# Patient Record
Sex: MALE | Race: WHITE | NOT HISPANIC OR LATINO | Employment: FULL TIME | URBAN - METROPOLITAN AREA
[De-identification: names, ages, dates, MRNs, and addresses within clinical notes are randomized per-mention and may not be internally consistent; named-entity substitution may affect disease eponyms.]

---

## 2023-06-22 ENCOUNTER — TELEPHONE (OUTPATIENT)
Dept: PRIMARY CARE | Facility: CLINIC | Age: 68
End: 2023-06-22
Payer: MEDICARE

## 2023-06-23 NOTE — TELEPHONE ENCOUNTER
Left DETAILED VM for pt that no bw ordered prior to apt but come fasting 10-12hrs prior to apt and bw can be drawn after his apt.

## 2023-07-12 ENCOUNTER — OFFICE VISIT (OUTPATIENT)
Dept: PRIMARY CARE | Facility: CLINIC | Age: 68
End: 2023-07-12
Payer: MEDICARE

## 2023-07-12 VITALS
HEIGHT: 72 IN | BODY MASS INDEX: 22.13 KG/M2 | DIASTOLIC BLOOD PRESSURE: 70 MMHG | WEIGHT: 163.4 LBS | RESPIRATION RATE: 18 BRPM | SYSTOLIC BLOOD PRESSURE: 138 MMHG | OXYGEN SATURATION: 99 % | HEART RATE: 83 BPM

## 2023-07-12 DIAGNOSIS — L72.0 EPIDERMOID CYST OF SKIN OF BACK: ICD-10-CM

## 2023-07-12 DIAGNOSIS — E78.2 MIXED HYPERLIPIDEMIA: ICD-10-CM

## 2023-07-12 DIAGNOSIS — L02.212 ABSCESS OF BACK: ICD-10-CM

## 2023-07-12 DIAGNOSIS — Z13.6 SCREENING FOR CARDIOVASCULAR CONDITION: ICD-10-CM

## 2023-07-12 DIAGNOSIS — Z00.00 ROUTINE GENERAL MEDICAL EXAMINATION AT HEALTH CARE FACILITY: ICD-10-CM

## 2023-07-12 DIAGNOSIS — R35.0 URINARY FREQUENCY: ICD-10-CM

## 2023-07-12 DIAGNOSIS — Z12.11 SCREENING FOR COLORECTAL CANCER: ICD-10-CM

## 2023-07-12 DIAGNOSIS — H66.002 NON-RECURRENT ACUTE SUPPURATIVE OTITIS MEDIA OF LEFT EAR WITHOUT SPONTANEOUS RUPTURE OF TYMPANIC MEMBRANE: ICD-10-CM

## 2023-07-12 DIAGNOSIS — Z00.00 MEDICARE WELCOME VISIT: Primary | ICD-10-CM

## 2023-07-12 DIAGNOSIS — Z12.5 SCREENING FOR PROSTATE CANCER: ICD-10-CM

## 2023-07-12 DIAGNOSIS — Z12.12 SCREENING FOR COLORECTAL CANCER: ICD-10-CM

## 2023-07-12 PROBLEM — E78.5 HYPERLIPIDEMIA: Status: ACTIVE | Noted: 2023-07-12

## 2023-07-12 PROBLEM — D69.6 THROMBOCYTOPENIA (CMS-HCC): Status: ACTIVE | Noted: 2023-07-12

## 2023-07-12 PROBLEM — R93.1 ABNORMAL SCREENING CARDIAC CT: Status: ACTIVE | Noted: 2023-07-12

## 2023-07-12 PROBLEM — H93.8X9 BLOCKED EAR: Status: ACTIVE | Noted: 2023-03-10

## 2023-07-12 PROBLEM — E55.9 VITAMIN D DEFICIENCY: Status: ACTIVE | Noted: 2023-07-12

## 2023-07-12 PROBLEM — M25.462 SWOLLEN L KNEE: Status: ACTIVE | Noted: 2020-07-10

## 2023-07-12 LAB
POC APPEARANCE, URINE: CLEAR
POC BILIRUBIN, URINE: NEGATIVE
POC BLOOD, URINE: NEGATIVE
POC COLOR, URINE: YELLOW
POC GLUCOSE, URINE: NEGATIVE MG/DL
POC KETONES, URINE: NEGATIVE MG/DL
POC LEUKOCYTES, URINE: NEGATIVE
POC NITRITE,URINE: NEGATIVE
POC PH, URINE: 7 PH
POC PROTEIN, URINE: NEGATIVE MG/DL
POC SPECIFIC GRAVITY, URINE: 1.01
POC UROBILINOGEN, URINE: 0.2 EU/DL

## 2023-07-12 PROCEDURE — 81003 URINALYSIS AUTO W/O SCOPE: CPT | Performed by: NURSE PRACTITIONER

## 2023-07-12 PROCEDURE — G0402 INITIAL PREVENTIVE EXAM: HCPCS | Performed by: NURSE PRACTITIONER

## 2023-07-12 PROCEDURE — 99213 OFFICE O/P EST LOW 20 MIN: CPT | Performed by: NURSE PRACTITIONER

## 2023-07-12 PROCEDURE — 1160F RVW MEDS BY RX/DR IN RCRD: CPT | Performed by: NURSE PRACTITIONER

## 2023-07-12 PROCEDURE — 1170F FXNL STATUS ASSESSED: CPT | Performed by: NURSE PRACTITIONER

## 2023-07-12 PROCEDURE — 1036F TOBACCO NON-USER: CPT | Performed by: NURSE PRACTITIONER

## 2023-07-12 PROCEDURE — 1159F MED LIST DOCD IN RCRD: CPT | Performed by: NURSE PRACTITIONER

## 2023-07-12 RX ORDER — EPINEPHRINE 0.22MG
1 AEROSOL WITH ADAPTER (ML) INHALATION 2 TIMES DAILY
COMMUNITY
Start: 2018-07-19

## 2023-07-12 RX ORDER — ASCORBIC ACID 125 MG
1 TABLET,CHEWABLE ORAL DAILY
COMMUNITY
Start: 2022-08-30

## 2023-07-12 RX ORDER — ACETAMINOPHEN 500 MG
1 TABLET ORAL DAILY
COMMUNITY
Start: 2018-07-19

## 2023-07-12 RX ORDER — LANOLIN ALCOHOL/MO/W.PET/CERES
1 CREAM (GRAM) TOPICAL DAILY
COMMUNITY
Start: 2018-07-19

## 2023-07-12 RX ORDER — AMOXICILLIN 875 MG/1
875 TABLET, FILM COATED ORAL 2 TIMES DAILY
Qty: 14 TABLET | Refills: 0 | Status: SHIPPED | OUTPATIENT
Start: 2023-07-12 | End: 2023-07-20 | Stop reason: ALTCHOICE

## 2023-07-12 RX ORDER — UBIDECARENONE 30 MG
1 CAPSULE ORAL DAILY
COMMUNITY
Start: 2018-07-19

## 2023-07-12 ASSESSMENT — ENCOUNTER SYMPTOMS
DEPRESSION: 0
LOSS OF SENSATION IN FEET: 0
OCCASIONAL FEELINGS OF UNSTEADINESS: 0

## 2023-07-12 ASSESSMENT — VISUAL ACUITY
OD_CC: 20/20
OS_CC: 20/20

## 2023-07-12 ASSESSMENT — ACTIVITIES OF DAILY LIVING (ADL)
TAKING_MEDICATION: INDEPENDENT
MANAGING_FINANCES: INDEPENDENT
BATHING: INDEPENDENT
DRESSING: INDEPENDENT
GROCERY_SHOPPING: INDEPENDENT
DOING_HOUSEWORK: INDEPENDENT

## 2023-07-12 ASSESSMENT — PATIENT HEALTH QUESTIONNAIRE - PHQ9
1. LITTLE INTEREST OR PLEASURE IN DOING THINGS: NOT AT ALL
2. FEELING DOWN, DEPRESSED OR HOPELESS: NOT AT ALL
SUM OF ALL RESPONSES TO PHQ9 QUESTIONS 1 AND 2: 0

## 2023-07-12 NOTE — PROGRESS NOTES
"Subjective   Reason for Visit: Tre Isidro is an 67 y.o. male here for a Medicare Wellness visit.     Past Medical, Surgical, and Family History reviewed and updated in chart.    Reviewed all medications by prescribing practitioner or clinical pharmacist (such as prescriptions, OTCs, herbal therapies and supplements) and documented in the medical record.    HPI    Patient Care Team:  ALLY Dunham as PCP - General (Family Medicine)     Review of Systems    Objective   Vitals:  /70   Pulse 83   Resp 18   Ht 1.835 m (6' 0.25\")   Wt 74.1 kg (163 lb 6.4 oz)   SpO2 99%   BMI 22.01 kg/m²       Physical Exam    Assessment/Plan   Problem List Items Addressed This Visit     Hyperlipidemia    Relevant Orders    Lipid Panel   Other Visit Diagnoses     Medicare welcome visit    -  Primary    Relevant Orders    Comprehensive Metabolic Panel    Abscess of back        Screening for prostate cancer        Relevant Orders    PSA, Total and Free    Urinary frequency        Relevant Orders    POCT UA Automated manually resulted (Completed)    Screening for cardiovascular condition        Relevant Orders    ECG 12 lead    Screening for colorectal cancer        Relevant Orders    Cologuard® colon cancer screening    Routine general medical examination at health care facility        Epidermoid cyst of skin of back        Relevant Orders    Referral to Dermatology    Non-recurrent acute suppurative otitis media of left ear without spontaneous rupture of tympanic membrane        Relevant Medications    amoxicillin (Amoxil) 875 mg tablet             "

## 2023-07-12 NOTE — PATIENT INSTRUCTIONS
Thank you for seeing me today.  It was a pleasure to see you again!    Today we did your Annual Medicare Wellness Exam and discussed the following:     #OTITIS MEDIA, LEFT  Rx Amoxicillin x 7 days     #URINARY FREQUENCY  IO urine negative  Call if symptoms persist after taking Amoxicillin       #CERUMEN IMPACTION  Continue to use debrox drops once or twice per week to keep wax from building up     #CYST ON BACK AND FACE  See Dermatology    Lab work ordered today.  Please have your blood drawn in the next 1-2 weeks.  You need to be FASTING for 12 hours prior to blood draw.  You may only have water.  Please contact your insurance company to ask about the best location to get blood drawn.  We will contact you with the results of your blood work and any necessary adjustments  to your plan of care, if you do not hear from us within 3-5 days of having your blood drawn, please call the office at [phone].    RTC ANNUALLY AND AS NEEDED

## 2023-07-14 ENCOUNTER — APPOINTMENT (OUTPATIENT)
Dept: PRIMARY CARE | Facility: CLINIC | Age: 68
End: 2023-07-14
Payer: MEDICARE

## 2023-07-14 ENCOUNTER — TELEPHONE (OUTPATIENT)
Dept: PRIMARY CARE | Facility: CLINIC | Age: 68
End: 2023-07-14

## 2023-07-14 DIAGNOSIS — N52.9 ERECTILE DYSFUNCTION, UNSPECIFIED ERECTILE DYSFUNCTION TYPE: Primary | ICD-10-CM

## 2023-07-20 ENCOUNTER — TELEPHONE (OUTPATIENT)
Dept: PRIMARY CARE | Facility: CLINIC | Age: 68
End: 2023-07-20
Payer: MEDICARE

## 2023-07-20 DIAGNOSIS — N41.0 ACUTE PROSTATITIS: Primary | ICD-10-CM

## 2023-07-20 RX ORDER — CIPROFLOXACIN 500 MG/1
500 TABLET ORAL 2 TIMES DAILY
Qty: 14 TABLET | Refills: 0 | Status: SHIPPED | OUTPATIENT
Start: 2023-07-20 | End: 2023-07-27

## 2023-07-27 ENCOUNTER — TELEPHONE (OUTPATIENT)
Dept: PRIMARY CARE | Facility: CLINIC | Age: 68
End: 2023-07-27
Payer: MEDICARE

## 2023-07-27 DIAGNOSIS — H91.93 BILATERAL HEARING LOSS, UNSPECIFIED HEARING LOSS TYPE: Primary | ICD-10-CM

## 2023-08-02 ENCOUNTER — LAB (OUTPATIENT)
Dept: LAB | Facility: LAB | Age: 68
End: 2023-08-02
Payer: MEDICARE

## 2023-08-02 DIAGNOSIS — E78.2 MIXED HYPERLIPIDEMIA: ICD-10-CM

## 2023-08-02 DIAGNOSIS — Z00.00 MEDICARE WELCOME VISIT: ICD-10-CM

## 2023-08-02 DIAGNOSIS — Z12.5 SCREENING FOR PROSTATE CANCER: ICD-10-CM

## 2023-08-02 LAB
ALANINE AMINOTRANSFERASE (SGPT) (U/L) IN SER/PLAS: 16 U/L (ref 10–52)
ALBUMIN (G/DL) IN SER/PLAS: 4.3 G/DL (ref 3.4–5)
ALKALINE PHOSPHATASE (U/L) IN SER/PLAS: 41 U/L (ref 33–136)
ANION GAP IN SER/PLAS: 14 MMOL/L (ref 10–20)
ASPARTATE AMINOTRANSFERASE (SGOT) (U/L) IN SER/PLAS: 21 U/L (ref 9–39)
BILIRUBIN TOTAL (MG/DL) IN SER/PLAS: 0.6 MG/DL (ref 0–1.2)
CALCIUM (MG/DL) IN SER/PLAS: 9.5 MG/DL (ref 8.6–10.3)
CARBON DIOXIDE, TOTAL (MMOL/L) IN SER/PLAS: 27 MMOL/L (ref 21–32)
CHLORIDE (MMOL/L) IN SER/PLAS: 104 MMOL/L (ref 98–107)
CHOLESTEROL (MG/DL) IN SER/PLAS: 175 MG/DL (ref 0–199)
CHOLESTEROL IN HDL (MG/DL) IN SER/PLAS: 52.8 MG/DL
CHOLESTEROL/HDL RATIO: 3.3
CREATININE (MG/DL) IN SER/PLAS: 0.75 MG/DL (ref 0.5–1.3)
GFR MALE: >90 ML/MIN/1.73M2
GLUCOSE (MG/DL) IN SER/PLAS: 83 MG/DL (ref 74–99)
LDL: 101 MG/DL (ref 0–99)
POTASSIUM (MMOL/L) IN SER/PLAS: 4.5 MMOL/L (ref 3.5–5.3)
PROTEIN TOTAL: 6.4 G/DL (ref 6.4–8.2)
SODIUM (MMOL/L) IN SER/PLAS: 140 MMOL/L (ref 136–145)
TRIGLYCERIDE (MG/DL) IN SER/PLAS: 107 MG/DL (ref 0–149)
UREA NITROGEN (MG/DL) IN SER/PLAS: 8 MG/DL (ref 6–23)
VLDL: 21 MG/DL (ref 0–40)

## 2023-08-02 PROCEDURE — 80061 LIPID PANEL: CPT

## 2023-08-02 PROCEDURE — 36415 COLL VENOUS BLD VENIPUNCTURE: CPT

## 2023-08-02 PROCEDURE — 84154 ASSAY OF PSA FREE: CPT

## 2023-08-02 PROCEDURE — 80053 COMPREHEN METABOLIC PANEL: CPT

## 2023-08-02 PROCEDURE — G0103 PSA SCREENING: HCPCS

## 2023-08-07 LAB
PROSTATE SPECIFIC AG (NG/ML) IN SER/PLAS: 1.3 NG/ML (ref 0–4)
PROSTATE SPECIFIC AG FREE (NG/ML) IN SER/PLAS: 0.3 NG/ML
PROSTATE SPECIFIC AG FREE/PROSTATE SPECIFIC AG TOTAL IN SER/PLAS: 23 %

## 2023-08-09 LAB — URINE CULTURE: NO GROWTH

## 2023-08-10 DIAGNOSIS — E55.9 VITAMIN D DEFICIENCY, UNSPECIFIED: ICD-10-CM

## 2023-08-10 DIAGNOSIS — E55.9 VITAMIN D DEFICIENCY: ICD-10-CM

## 2023-08-10 DIAGNOSIS — D69.6 THROMBOCYTOPENIA (CMS-HCC): Primary | ICD-10-CM

## 2023-08-29 ENCOUNTER — LAB (OUTPATIENT)
Dept: LAB | Facility: LAB | Age: 68
End: 2023-08-29
Payer: MEDICARE

## 2023-08-29 DIAGNOSIS — E55.9 VITAMIN D DEFICIENCY, UNSPECIFIED: ICD-10-CM

## 2023-08-29 DIAGNOSIS — E55.9 VITAMIN D DEFICIENCY: ICD-10-CM

## 2023-08-29 DIAGNOSIS — D69.6 THROMBOCYTOPENIA (CMS-HCC): ICD-10-CM

## 2023-08-29 LAB
CALCIDIOL (25 OH VITAMIN D3) (NG/ML) IN SER/PLAS: 35 NG/ML
ERYTHROCYTE DISTRIBUTION WIDTH (RATIO) BY AUTOMATED COUNT: 11.9 % (ref 11.5–14.5)
ERYTHROCYTE MEAN CORPUSCULAR HEMOGLOBIN CONCENTRATION (G/DL) BY AUTOMATED: 32.9 G/DL (ref 32–36)
ERYTHROCYTE MEAN CORPUSCULAR VOLUME (FL) BY AUTOMATED COUNT: 98 FL (ref 80–100)
ERYTHROCYTES (10*6/UL) IN BLOOD BY AUTOMATED COUNT: 4.57 X10E12/L (ref 4.5–5.9)
ESTIMATED AVERAGE GLUCOSE FOR HBA1C: 97 MG/DL
FOLLITROPIN (IU/L) IN SER/PLAS: 12.3 IU/L
HEMATOCRIT (%) IN BLOOD BY AUTOMATED COUNT: 44.7 % (ref 41–52)
HEMATOCRIT (%) IN BLOOD BY AUTOMATED COUNT: NORMAL
HEMOGLOBIN (G/DL) IN BLOOD: 14.7 G/DL (ref 13.5–17.5)
HEMOGLOBIN A1C/HEMOGLOBIN TOTAL IN BLOOD: 5 %
LEUKOCYTES (10*3/UL) IN BLOOD BY AUTOMATED COUNT: 5.1 X10E9/L (ref 4.4–11.3)
LUTEINIZING HORMONE (IU/ML) IN SER/PLAS: 6.7 IU/L
PLATELETS (10*3/UL) IN BLOOD AUTOMATED COUNT: 137 X10E9/L (ref 150–450)
PROLACTIN (UG/L) IN SER/PLAS: 11.5 UG/L (ref 2–18)
TESTOSTERONE (NG/DL) IN SER/PLAS: 830 NG/DL (ref 240–1000)

## 2023-08-29 PROCEDURE — 36415 COLL VENOUS BLD VENIPUNCTURE: CPT

## 2023-08-29 PROCEDURE — 82306 VITAMIN D 25 HYDROXY: CPT

## 2023-08-29 PROCEDURE — 85027 COMPLETE CBC AUTOMATED: CPT

## 2023-10-01 PROBLEM — R06.02 EXERTIONAL SHORTNESS OF BREATH: Status: ACTIVE | Noted: 2023-10-01

## 2023-10-01 PROBLEM — N52.9 ERECTILE DYSFUNCTION: Status: ACTIVE | Noted: 2023-10-01

## 2023-10-01 PROBLEM — H65.92 LEFT OTITIS MEDIA WITH EFFUSION: Status: ACTIVE | Noted: 2023-10-01

## 2023-10-01 PROBLEM — N13.8 BPH WITH OBSTRUCTION/LOWER URINARY TRACT SYMPTOMS: Status: ACTIVE | Noted: 2023-10-01

## 2023-10-01 PROBLEM — R39.15 URINARY URGENCY: Status: ACTIVE | Noted: 2023-10-01

## 2023-10-01 PROBLEM — F52.4 PREMATURE EJACULATION: Status: ACTIVE | Noted: 2023-10-01

## 2023-10-01 PROBLEM — N40.1 BPH WITH OBSTRUCTION/LOWER URINARY TRACT SYMPTOMS: Status: ACTIVE | Noted: 2023-10-01

## 2023-10-01 RX ORDER — TADALAFIL 20 MG/1
20 TABLET ORAL
COMMUNITY
Start: 2023-09-22

## 2023-10-03 ENCOUNTER — APPOINTMENT (OUTPATIENT)
Dept: AUDIOLOGY | Facility: CLINIC | Age: 68
End: 2023-10-03
Payer: MEDICARE

## 2023-10-03 ENCOUNTER — APPOINTMENT (OUTPATIENT)
Dept: OTOLARYNGOLOGY | Facility: CLINIC | Age: 68
End: 2023-10-03
Payer: MEDICARE

## 2023-10-09 ENCOUNTER — OFFICE VISIT (OUTPATIENT)
Dept: DERMATOLOGY | Facility: CLINIC | Age: 68
End: 2023-10-09
Payer: MEDICARE

## 2023-10-09 DIAGNOSIS — D69.2: ICD-10-CM

## 2023-10-09 DIAGNOSIS — L57.0 ACTINIC KERATOSIS: Primary | ICD-10-CM

## 2023-10-09 DIAGNOSIS — M67.40 GANGLION CYST: ICD-10-CM

## 2023-10-09 DIAGNOSIS — L72.0 EPIDERMAL CYST: ICD-10-CM

## 2023-10-09 DIAGNOSIS — L82.1 SEBORRHEIC KERATOSIS: ICD-10-CM

## 2023-10-09 PROCEDURE — 1160F RVW MEDS BY RX/DR IN RCRD: CPT | Performed by: SPECIALIST

## 2023-10-09 PROCEDURE — 17000 DESTRUCT PREMALG LESION: CPT | Performed by: SPECIALIST

## 2023-10-09 PROCEDURE — 99214 OFFICE O/P EST MOD 30 MIN: CPT | Performed by: SPECIALIST

## 2023-10-09 PROCEDURE — 17003 DESTRUCT PREMALG LES 2-14: CPT | Performed by: SPECIALIST

## 2023-10-09 PROCEDURE — 1036F TOBACCO NON-USER: CPT | Performed by: SPECIALIST

## 2023-10-09 PROCEDURE — 1159F MED LIST DOCD IN RCRD: CPT | Performed by: SPECIALIST

## 2023-10-09 NOTE — PROGRESS NOTES
Subjective   HPI: Tre Isidro is a 67 y.o. male is here for evaluation and treatment of several lesions involving his body..     ROS: No other skin or systemic complaints other than what is documented elsewhere in the note.    Skin Cancer History  No skin cancer on file.      ALLERGIES: Sulfamethoxazole-trimethoprim    SOCIAL:  reports that he has never smoked. He has never used smokeless tobacco. He reports current alcohol use of about 6.0 standard drinks of alcohol per week. He reports that he does not currently use drugs.    Objective   Mid Forehead (2), Right [employer]  -Thin erythematous papules with gritty scale      Left Zygomatic Area, Mid Back  Subcutaneous, mobile nodule with a central punctum.    Right Abdomen (side) - Upper, Right Forearm - Anterior  Stuck on verrucous, variably pigmented papules and plaques.    Left Knee - Anterior          Description: Patient has 2 erythematous scaly sensitive lesions on photodamaged area mid forehead and right temple.    Assessment/Plan   1. Actinic keratosis (3)  Mid Forehead (2); Right [employer]    Educated patient on the potential for malignant transformation. Patient opted for treatment at this time with cryotherapy.    Cryotherapy, skin lesion - Mid Forehead (2), Right Temple    2. Epidermal cyst (2)  Mid Back; Left Zygomatic Area    Schedule for excision on x2 separate sterile Tuesdays    3. Seborrheic keratosis (2)  Right Forearm - Anterior; Right Abdomen (side) - Upper    Educated patient on the benign nature of Sk's and how to know when they are inflamed. Patient opts for no treatment at this time.    4. Ganglion cyst  Left Knee - Anterior    5. Non-thrombocytopenic purpura (CMS/HCC)  Right Forearm - Anterior         FOLLOW UP: 2 weeks for sterile surgical excision and follow-up.  This but this could be more thanSubjective   HPI: Tre Isidro is a 67 y.o. male is here for evaluation and treatment of   Discolored areas involving his forearms.  ROS: No other skin  or systemic complaints other than what is documented elsewhere in the note.    Skin Cancer History  No skin cancer on file.      ALLERGIES: Sulfamethoxazole-trimethoprim    SOCIAL:  reports that he has never smoked. He has never used smokeless tobacco. He reports current alcohol use of about 6.0 standard drinks of alcohol per week. He reports that he does not currently use drugs.    Objective   Mid Forehead (2), Right [employer]  -Thin erythematous papules with gritty scale      Left Zygomatic Area, Mid Back  Subcutaneous, mobile nodule with a central punctum.    Right Abdomen (side) - Upper, Right Forearm - Anterior  Stuck on verrucous, variably pigmented papules and plaques.    Left Knee - Anterior          Description: Erythematous scaly lesions involving his forehead.    Assessment/Plan   1. Actinic keratosis (3)  Mid Forehead (2); Right [employer]    Educated patient on the potential for malignant transformation. Patient opted for treatment at this time with cryotherapy.    Cryotherapy, skin lesion - Mid Forehead (2), Right Temple    2. Epidermal cyst (2)  Mid Back; Left Zygomatic Area    Schedule for excision on x2 separate sterile Tuesdays    3. Seborrheic keratosis (2)  Right Forearm - Anterior; Right Abdomen (side) - Upper    Educated patient on the benign nature of Sk's and how to know when they are inflamed. Patient opts for no treatment at this time.    4. Ganglion cyst  Left Knee - Anterior    5. Non-thrombocytopenic purpura (CMS/HCC)  Right Forearm - Anterior         FOLLOW UP: 2 weeks for sterile surgical excision.    The patient was encouraged to contact me with any further questions or concerns.  Mikal Benitez MD  10/9/2023 so this right ear versus left knee    The patient was encouraged to contact me with any further questions or concerns.  Mikal Benitez MD  10/9/2023

## 2023-10-17 ENCOUNTER — OFFICE VISIT (OUTPATIENT)
Dept: OTOLARYNGOLOGY | Facility: CLINIC | Age: 68
End: 2023-10-17
Payer: MEDICARE

## 2023-10-17 VITALS — WEIGHT: 160 LBS | BODY MASS INDEX: 21.55 KG/M2

## 2023-10-17 DIAGNOSIS — J31.0 CHRONIC RHINITIS: ICD-10-CM

## 2023-10-17 DIAGNOSIS — J30.9 ALLERGIC RHINITIS, UNSPECIFIED SEASONALITY, UNSPECIFIED TRIGGER: ICD-10-CM

## 2023-10-17 DIAGNOSIS — H69.90 DISORDER OF EUSTACHIAN TUBE, UNSPECIFIED LATERALITY: ICD-10-CM

## 2023-10-17 DIAGNOSIS — K21.9 LARYNGOPHARYNGEAL REFLUX (LPR): ICD-10-CM

## 2023-10-17 PROCEDURE — 1036F TOBACCO NON-USER: CPT | Performed by: GENERAL PRACTICE

## 2023-10-17 PROCEDURE — 99204 OFFICE O/P NEW MOD 45 MIN: CPT | Performed by: GENERAL PRACTICE

## 2023-10-17 PROCEDURE — 69210 REMOVE IMPACTED EAR WAX UNI: CPT | Performed by: GENERAL PRACTICE

## 2023-10-17 PROCEDURE — 1159F MED LIST DOCD IN RCRD: CPT | Performed by: GENERAL PRACTICE

## 2023-10-17 PROCEDURE — 1160F RVW MEDS BY RX/DR IN RCRD: CPT | Performed by: GENERAL PRACTICE

## 2023-10-17 RX ORDER — FLUTICASONE PROPIONATE 50 MCG
2 SPRAY, SUSPENSION (ML) NASAL DAILY
Qty: 16 G | Refills: 2 | Status: SHIPPED | OUTPATIENT
Start: 2023-10-17 | End: 2023-11-16

## 2023-10-17 RX ORDER — FAMOTIDINE 20 MG/1
20 TABLET, FILM COATED ORAL NIGHTLY
Qty: 30 TABLET | Refills: 2 | Status: SHIPPED | OUTPATIENT
Start: 2023-10-17 | End: 2023-11-16

## 2023-10-17 ASSESSMENT — ENCOUNTER SYMPTOMS
COUGH: 0
NECK PAIN: 0
ABDOMINAL PAIN: 0
DIARRHEA: 0
SORE THROAT: 0
RHINORRHEA: 0
VOMITING: 0
HEADACHES: 0

## 2023-10-17 ASSESSMENT — PATIENT HEALTH QUESTIONNAIRE - PHQ9
2. FEELING DOWN, DEPRESSED OR HOPELESS: NOT AT ALL
1. LITTLE INTEREST OR PLEASURE IN DOING THINGS: NOT AT ALL
SUM OF ALL RESPONSES TO PHQ9 QUESTIONS 1 AND 2: 0

## 2023-10-17 NOTE — PROGRESS NOTES
Otolaryngology - Head and Neck Surgery Outpatient New Patient Visit Note         History Of Present Illness  Tre Isidro is a 67 y.o. male presenting for evaluation of left ear discomfort.     Reports a history of cerumen impaction, debrided by PCM.  Reports some discomfort with debridement, and mild waxing/waning soreness since chase ttime.     Reports some baseline hearing loss L>R for >1y.      Reports treatment with oral antibiotics for otalgia with suspicions of AOM, but no improvement.     Reports ongoing mild itching and ear irritation on the left.      Reports some difficulty in clearing ears L>R     Reports history of allergic rhinitis, with intermittent flonase use with some effect.      Reports a history of GERD for which he uses famotidine with some effect.               Past Medical History  He has a past medical history of Personal history of other diseases of the respiratory system, Personal history of other endocrine, nutritional and metabolic disease, and Personal history of other infectious and parasitic diseases.    Surgical History  He has a past surgical history that includes [place] tooth extraction ().     Social History  He reports that he has never smoked. He has never used smokeless tobacco. He reports current alcohol use of about 6.0 standard drinks of alcohol per week. He reports that he does not currently use drugs.    Family History  Family History   Problem Relation Name Age of Onset    Arthritis Mother Rose Marie         Mother  at age 80    Diabetes Mother Rose Marie     Stroke Mother Rose Marie     Heart failure Mother Rose Marie     Osteoarthritis Mother Rose Marie     Heart disease Father Rajinder     Heart attack Father Rajinder     Colon cancer Other family HX     Prostate cancer Other family HX         Allergies  Sulfamethoxazole-trimethoprim    Review of Systems  ROS: Pertinent positives as noted in HPI.    - CONSTITUTIONAL: Does not report weight loss, fever or chills.    - HEENT:   Ear: Does  not report tinnitus, vertigo,    ,  , otorrhea  Nose: Does not report  , rhinorrhea, epistaxis, decreased smell  Throat: Does not report pain, dysphagia, odynophagia  Larynx: Does not report hoarseness,  difficulty breathing, pain with speaking (odynophonia)  Neck: Does not report new masses, pain, swelling  Face: Does not report sinus pain, pressure, swelling, numbness, weakness     - RESPIRATORY: Does not report SOB or cough.    - CV: Does not report palpitations or chest pain.     - GI: Does not report abdominal pain, nausea, vomiting or diarrhea.    - : Does not report dysuria or urinary frequency.    - MSK: Does not report myalgia or joint pain.    - SKIN: Does not report rash or pruritus.    - NEUROLOGICAL: Does not report headache or syncope.    - PSYCHIATRIC: Does not report recent changes in mood. Does not report anxiety or depression.         Physical Exam:     GENERAL:   Alert & Oriented to person, place and time; Normal affect and appearance. Well developed and well nourished. Conversant & cooperative with examination.     HEAD:   Normocephalic, atraumatic. No sinus tenderness to palpation. Normal parotid bilaterally. Normal facial strength.     NEUROLOGIC:   Cranial nerves II-XII grossly intact, gait WNL. Normal mood and affect.    EYES:   Extraocular movements intact. Pupils equal, round, reactive to light and accommodation. No nystagmus, no ptosis. no scleral injection.    EAR:   Normal auricle. No discomfort or TTP with manipulation.   Handheld otoscopic exam showed normal external auditory canals bilaterally. No purulence or EAC inflammation. Obstructive cerumen remvoved with suction.   Right tympanic membrane clear and mobile without evidence of perforation, retraction or middle ear effusion.   Left tympanic membrane clear and poorly mobile without evidence of perforation, retraction or middle ear effusion.     NOSE:   No external deformity. No external nasal lesions, lacerations, or scars. Nasal  tip symmetrical with normal nasal valves.   Nasal cavity with essentially midline septum, edematous  mucosa and turbinates. No lesions, masses, purulence or polyps.     OC/OP:   Mucous membranes moist, no masses, lesions or exudates.   Normal tongue, floor of mouth, teeth, gums, lips. Normal posterior pharyngeal wall.    Normal tonsils without erythema, exudate or obvious calculi     NECK:   No neck masses or thyroid enlargement. Trachea midline. No tenderness to palpation    LYMPHATIC:   No cervical lymphadenopathy.     RESPIRATORY:   Symmetric chest elevation & no retractions. No significant hoarseness. No increased work of breathing.    CV:   No clubbing or cyanosis. No obvious edema    Skin:   No facial rashes, vesicles or lesions.     Extremities:   No gross abnormalities      Clinic Procedure    Binocular microscopy exam  Indication: tympanic membrane(s) could not be visualized adequately with handheld otoscopy.   Location:  bilateral ears  Visualization Instrument: A microscope was used to visualize through a speculum placed in the ear canal(s) to visualize the ear canal, tympanic membranes and to assist in assessment and removal of debris.  Findings:  see physical exam documentation  Patient Status: The patient tolerated the procedure well.   Complications: There were no complications.       Information review:  External sources (notes, imaging, lab results) listed below personally reviewed to aid in medical decision making process.  -  -  -      Assessment/Plan   Problem List Items Addressed This Visit    None  Visit Diagnoses         Codes    Disorder of Eustachian tube, unspecified laterality     H69.90    Relevant Orders    Referral to Audiology    Allergic rhinitis, unspecified seasonality, unspecified trigger     J30.9    Relevant Medications    fluticasone (Flonase) 50 mcg/actuation nasal spray    Laryngopharyngeal reflux (LPR)     K21.9    Relevant Medications    famotidine (Pepcid) 20 mg tablet     Chronic rhinitis     J31.0          67yoM with bothersome ear itching/irritation.  Some residual cerumen debrided, but no evidence of otitis.  Symptoms suggestive of ETD, will control for history of rhinitis and reflux and acquire audiogram.  RTC to review.           Follow up:  -plan for follow up in clinic as needed and in 1-2 months    All of the above findings, impressions, treatment planning and follow up plans were discussed with the patient who indicated understanding.  the patient was instructed to contact or return to clinic sooner if symptoms/signs persist or worsen despite the above management.      Brown Malin MD  Otolaryngology - Head and Neck Surgery   Answers submitted by the patient for this visit:  Ear Pain Questionnaire (Submitted on 10/17/2023)  Chief Complaint: Ear pain  Affected ear: left  Chronicity: new  Onset: more than 1 month ago  Progression since onset: unchanged  Frequency: constantly  Fever: no fever  Pain - numeric: 1/10  abdominal pain: No  ear discharge: No  rash: No  cough: No  headaches: No  rhinorrhea: No  diarrhea: No  hearing loss: Yes  sore throat: No  neck pain: No  vomiting: No

## 2023-10-31 ENCOUNTER — PROCEDURE VISIT (OUTPATIENT)
Dept: DERMATOLOGY | Facility: CLINIC | Age: 68
End: 2023-10-31
Payer: MEDICARE

## 2023-10-31 DIAGNOSIS — L72.0 EPIDERMAL CYST: ICD-10-CM

## 2023-10-31 PROCEDURE — 13132 CMPLX RPR F/C/C/M/N/AX/G/H/F: CPT | Performed by: SPECIALIST

## 2023-10-31 PROCEDURE — 88304 TISSUE EXAM BY PATHOLOGIST: CPT | Mod: TC,DER | Performed by: SPECIALIST

## 2023-10-31 PROCEDURE — 11423 EXC H-F-NK-SP B9+MARG 2.1-3: CPT | Performed by: SPECIALIST

## 2023-10-31 PROCEDURE — 88304 TISSUE EXAM BY PATHOLOGIST: CPT | Performed by: DERMATOLOGY

## 2023-10-31 NOTE — PROGRESS NOTES
Subjective     Tre Isidro is a 67 y.o. male who presents for the following: Sterile Surgery (Epidermal Cyst ).     Review of Systems:  No other skin or systemic complaints other than what is documented elsewhere in the note.    The following portions of the chart were reviewed this encounter and updated as appropriate:          Skin Cancer History  No skin cancer on file.      Specialty Problems    None       Objective   Well appearing patient in no apparent distress; mood and affect are within normal limits.    A focused skin examination was performed. All findings within normal limits unless otherwise noted below.    Assessment/Plan   1. Epidermal cyst  Left Preauricular Area    Skin excision - Left Preauricular Area    Informed consent: discussed and consent obtained    Timeout: patient name, date of birth, surgical site, and procedure verified    Procedure prep:  Patient prepped in sterile fashion  Anesthesia: the lesion was anesthetized in a standard fashion    Anesthetic:  1% lidocaine w/ epinephrine 1-100,000 local infiltration  Instrument used: #15 blade    Hemostasis achieved with: suture, pressure and electrodesiccation    Outcome: patient tolerated procedure well with no complications    Post-procedure details: sterile dressing applied and wound care instructions given    Dressing type: bandage    Additional details:  The possible diagnoses were explained. Although the lesion is likely benign, the patient requests removal of the lesion because of the symptoms it is causing. Excision was discussed with the patient. The risks, benefits and potential adverse effects were reviewed. Discussion included but was not limited to the cure rate, relative cost, wound care requirements, activity restrictions, likely scar outcome and time to heal were reviewed. Alternative options including monitoring the lesion were discussed. The patient elected to proceed with excision.     Specimen 1 - Dermatopathology- DERM  LAB  Differential Diagnosis: epidermal cyst vs lipoma  Check Margins Yes/No?:    Comments:    Dermpath Lab: Routine Histopathology (formalin-fixed tissue)

## 2023-11-02 LAB
LABORATORY COMMENT REPORT: NORMAL
PATH REPORT.FINAL DX SPEC: NORMAL
PATH REPORT.GROSS SPEC: NORMAL
PATH REPORT.MICROSCOPIC SPEC OTHER STN: NORMAL
PATH REPORT.RELEVANT HX SPEC: NORMAL
PATH REPORT.TOTAL CANCER: NORMAL

## 2023-11-10 ENCOUNTER — OFFICE VISIT (OUTPATIENT)
Dept: DERMATOLOGY | Facility: CLINIC | Age: 68
End: 2023-11-10
Payer: MEDICARE

## 2023-11-10 DIAGNOSIS — Z48.02 VISIT FOR SUTURE REMOVAL: ICD-10-CM

## 2023-11-10 PROCEDURE — 1036F TOBACCO NON-USER: CPT | Performed by: SPECIALIST

## 2023-11-10 PROCEDURE — 1160F RVW MEDS BY RX/DR IN RCRD: CPT | Performed by: SPECIALIST

## 2023-11-10 PROCEDURE — 99024 POSTOP FOLLOW-UP VISIT: CPT | Performed by: SPECIALIST

## 2023-11-10 PROCEDURE — 1159F MED LIST DOCD IN RCRD: CPT | Performed by: SPECIALIST

## 2023-11-10 ASSESSMENT — PATIENT GLOBAL ASSESSMENT (PGA): WHAT IS THE PGA: PATIENT GLOBAL ASSESSMENT:  1 - CLEAR

## 2023-11-10 ASSESSMENT — DERMATOLOGY QUALITY OF LIFE (QOL) ASSESSMENT
RATE HOW BOTHERED YOU ARE BY SYMPTOMS OF YOUR SKIN PROBLEM (EG, ITCHING, STINGING BURNING, HURTING OR SKIN IRRITATION): 1
WHAT SINGLE SKIN CONDITION LISTED BELOW IS THE PATIENT ANSWERING THE QUALITY-OF-LIFE ASSESSMENT QUESTIONS ABOUT: NONE OF THE ABOVE
DATE THE QUALITY-OF-LIFE ASSESSMENT WAS COMPLETED: 66788
RATE HOW BOTHERED YOU ARE BY EFFECTS OF YOUR SKIN PROBLEMS ON YOUR ACTIVITIES (EG, GOING OUT, ACCOMPLISHING WHAT YOU WANT, WORK ACTIVITIES OR YOUR RELATIONSHIPS WITH OTHERS): 0 - NEVER BOTHERED
RATE HOW BOTHERED YOU ARE BY EFFECTS OF YOUR SKIN PROBLEMS ON YOUR ACTIVITIES (EG, GOING OUT, ACCOMPLISHING WHAT YOU WANT, WORK ACTIVITIES OR YOUR RELATIONSHIPS WITH OTHERS): 0 - NEVER BOTHERED
RATE HOW BOTHERED YOU ARE BY SYMPTOMS OF YOUR SKIN PROBLEM (EG, ITCHING, STINGING BURNING, HURTING OR SKIN IRRITATION): 1
WHAT SINGLE SKIN CONDITION LISTED BELOW IS THE PATIENT ANSWERING THE QUALITY-OF-LIFE ASSESSMENT QUESTIONS ABOUT: NONE OF THE ABOVE
RATE HOW EMOTIONALLY BOTHERED YOU ARE BY YOUR SKIN PROBLEM (FOR EXAMPLE, WORRY, EMBARRASSMENT, FRUSTRATION): 0 - NEVER BOTHERED
RATE HOW EMOTIONALLY BOTHERED YOU ARE BY YOUR SKIN PROBLEM (FOR EXAMPLE, WORRY, EMBARRASSMENT, FRUSTRATION): 0 - NEVER BOTHERED

## 2023-11-10 NOTE — PROGRESS NOTES
Eleni Isidro is a 67 y.o. male who presents for the following: Suture / Staple Removal (SKIN, LEFT PREAURICULAR AREA: [id number]/EPIDERMAL INCLUSION CYST./ /).     Review of Systems:  No other skin or systemic complaints other than what is documented elsewhere in the note.    The following portions of the chart were reviewed this encounter and updated as appropriate:          Skin Cancer History  No skin cancer on file.      Specialty Problems    None       Objective   Well appearing patient in no apparent distress; mood and affect are within normal limits.    A focused skin examination was performed. All findings within normal limits unless otherwise noted below.    Assessment/Plan   1. Visit for suture removal  Left Preauricular Area        Eleni Isidro is a 67 y.o. male who presents for the following: Suture / Staple Removal.     Review of Systems:  No other skin or systemic complaints other than what is documented elsewhere in the note.    The following portions of the chart were reviewed this encounter and updated as appropriate:          Skin Cancer History  No skin cancer on file.      Specialty Problems    None       Objective   Well appearing patient in no apparent distress; mood and affect are within normal limits.    A focused skin examination was performed. All findings within normal limits unless otherwise noted below.    Assessment/Plan

## 2023-11-14 ENCOUNTER — APPOINTMENT (OUTPATIENT)
Dept: DERMATOLOGY | Facility: CLINIC | Age: 68
End: 2023-11-14
Payer: MEDICARE

## 2023-11-28 ENCOUNTER — APPOINTMENT (OUTPATIENT)
Dept: DERMATOLOGY | Facility: CLINIC | Age: 68
End: 2023-11-28
Payer: MEDICARE

## 2023-12-19 ENCOUNTER — PROCEDURE VISIT (OUTPATIENT)
Dept: DERMATOLOGY | Facility: CLINIC | Age: 68
End: 2023-12-19
Payer: MEDICARE

## 2023-12-19 DIAGNOSIS — L72.0 EPIDERMAL CYST: ICD-10-CM

## 2023-12-19 PROCEDURE — 13101 CMPLX RPR TRUNK 2.6-7.5 CM: CPT | Performed by: SPECIALIST

## 2023-12-19 PROCEDURE — 11406 EXC TR-EXT B9+MARG >4.0 CM: CPT | Performed by: SPECIALIST

## 2023-12-19 PROCEDURE — 88304 TISSUE EXAM BY PATHOLOGIST: CPT | Mod: TC,DER | Performed by: SPECIALIST

## 2023-12-19 PROCEDURE — 88304 TISSUE EXAM BY PATHOLOGIST: CPT | Performed by: DERMATOLOGY

## 2023-12-19 NOTE — PROGRESS NOTES
Subjective     Tre Isidro is a 68 y.o. male who presents for the following: SS Procedure.     Review of Systems:  No other skin or systemic complaints other than what is documented elsewhere in the note.    The following portions of the chart were reviewed this encounter and updated as appropriate:          Skin Cancer History  No skin cancer on file.      Specialty Problems    None       Objective   Well appearing patient in no apparent distress; mood and affect are within normal limits.    A focused skin examination was performed. All findings within normal limits unless otherwise noted below.    Assessment/Plan   1. Epidermal cyst  Mid Back    Skin excision - Mid Back    Timeout: patient name, date of birth, surgical site, and procedure verified    Procedure prep:  Patient was prepped and draped  Instrument used: #15 blade    Hemostasis achieved with: suture    Outcome: patient tolerated procedure well with no complications    Post-procedure details: wound care instructions given      Specimen 1 - Dermatopathology- DERM LAB  Differential Diagnosis: cyst   Check Margins Yes/No?:    Comments:    Dermpath Lab: Routine Histopathology (formalin-fixed tissue)

## 2023-12-19 NOTE — PROGRESS NOTES
Subjective   HPI: Tre Isidro is a 68 y.o. male is here for evaluation and treatment of a cyst on my back.    ROS: No other skin or systemic complaints other than what is documented elsewhere in the note.    ALLERGIES: Sulfamethoxazole-trimethoprim    SOCIAL:  reports that he has never smoked. He has never used smokeless tobacco. He reports current alcohol use of about 6.0 standard drinks of alcohol per week. He reports that he does not currently use drugs.    Objective     Description: This is an inflamed dome-shaped hard subcutaneous cystic lesion with a giant open comedone located centrally.    Assessment/Plan   1. Epidermal cyst  Mid Back    Skin excision - Mid Back    Specimen 1 - Dermatopathology- DERM LAB  Differential Diagnosis: cyst   Check Margins Yes/No?:    Comments:    Dermpath Lab: Routine Histopathology (formalin-fixed tissue)         FOLLOW UP: 10 days for suture removal.    The patient was encouraged to contact me with any further questions or concerns.  Mikal Benitez MD  12/19/2023

## 2023-12-29 ENCOUNTER — OFFICE VISIT (OUTPATIENT)
Dept: DERMATOLOGY | Facility: CLINIC | Age: 68
End: 2023-12-29
Payer: MEDICARE

## 2023-12-29 DIAGNOSIS — Z48.02 VISIT FOR SUTURE REMOVAL: ICD-10-CM

## 2023-12-29 PROCEDURE — 1159F MED LIST DOCD IN RCRD: CPT | Performed by: SPECIALIST

## 2023-12-29 PROCEDURE — 99024 POSTOP FOLLOW-UP VISIT: CPT | Performed by: SPECIALIST

## 2023-12-29 PROCEDURE — 1160F RVW MEDS BY RX/DR IN RCRD: CPT | Performed by: SPECIALIST

## 2023-12-29 PROCEDURE — 1036F TOBACCO NON-USER: CPT | Performed by: SPECIALIST

## 2023-12-29 NOTE — PROGRESS NOTES
Subjective   HPI: Tre Isidro is a 68 y.o. male is here for suture removal.     ROS: No other skin or systemic complaints other than what is documented elsewhere in the note.    ALLERGIES: Sulfamethoxazole-trimethoprim    SOCIAL:  reports that he has never smoked. He has never used smokeless tobacco. He reports current alcohol use of about 6.0 standard drinks of alcohol per week. He reports that he does not currently use drugs.    Objective     Description: This is a well-healing surgical wound.  There is no sign of infection.  Sutures removed without incident.    Assessment/Plan   1. Visit for suture removal  Right Upper Back         FOLLOW UP: 3 months or sooner if any lesions change.    The patient was encouraged to contact me with any further questions or concerns.  Mikal Benitez MD  12/29/2023

## 2023-12-29 NOTE — PROGRESS NOTES
Subjective     Tre Isidro is a 68 y.o. male who presents for the following: Suture / Staple Removal ([id number] /SKIN MID BACK:/EPIDERMAL INCLUSION CYST./).     Review of Systems:  No other skin or systemic complaints other than what is documented elsewhere in the note.    The following portions of the chart were reviewed this encounter and updated as appropriate:          Skin Cancer History  No skin cancer on file.      Specialty Problems    None       Objective   Well appearing patient in no apparent distress; mood and affect are within normal limits.    A focused skin examination was performed. All findings within normal limits unless otherwise noted below.    Assessment/Plan   1. Visit for suture removal  Right Upper Back

## 2024-02-27 ENCOUNTER — PROCEDURE VISIT (OUTPATIENT)
Dept: DERMATOLOGY | Facility: CLINIC | Age: 69
End: 2024-02-27
Payer: MEDICARE

## 2024-02-27 DIAGNOSIS — L72.3 SEBACEOUS CYST: ICD-10-CM

## 2024-02-27 PROCEDURE — 88304 TISSUE EXAM BY PATHOLOGIST: CPT | Performed by: DERMATOLOGY

## 2024-02-27 PROCEDURE — 11400 EXC TR-EXT B9+MARG 0.5 CM<: CPT | Performed by: SPECIALIST

## 2024-02-27 NOTE — PROGRESS NOTES
Subjective   HPI: Tre Isidro is a 68 y.o. male is here for evaluation and treatment of a small cyst on my back..     ROS: No other skin or systemic complaints other than what is documented elsewhere in the note.    ALLERGIES: Sulfamethoxazole-trimethoprim    SOCIAL:  reports that he has never smoked. He has never used smokeless tobacco. He reports current alcohol use of about 6.0 standard drinks of alcohol per week. He reports that he does not currently use drugs.    Objective     Description: Has dissipated revealing an inflamed te giant comedone large open pore with possible underlying cyst.  Diagnostic consideration would include dilated pore of Jules.  This is inflamed.    Assessment/Plan   1. Sebaceous cyst  Right Upper Back    Skin biopsy - Right Upper Back    Specimen 1 - Dermatopathology- DERM LAB  Differential Diagnosis: Cyst   Check Margins Yes/No?:    Comments:    Dermpath Lab: Routine Histopathology (formalin-fixed tissue)       Plan: Sterile surgical exci    FOLLOW UP: 7 days for suture removal.    The patient was encouraged to contact me with any further questions or concerns.  Mikal Benitez MD  2/27/2024

## 2024-03-05 ENCOUNTER — APPOINTMENT (OUTPATIENT)
Dept: DERMATOLOGY | Facility: CLINIC | Age: 69
End: 2024-03-05
Payer: MEDICARE

## 2024-03-07 ENCOUNTER — OFFICE VISIT (OUTPATIENT)
Dept: DERMATOLOGY | Facility: CLINIC | Age: 69
End: 2024-03-07
Payer: MEDICARE

## 2024-03-07 DIAGNOSIS — Z48.02 VISIT FOR SUTURE REMOVAL: ICD-10-CM

## 2024-03-07 PROCEDURE — 99024 POSTOP FOLLOW-UP VISIT: CPT | Performed by: SPECIALIST

## 2024-03-07 PROCEDURE — 1036F TOBACCO NON-USER: CPT | Performed by: SPECIALIST

## 2024-03-07 PROCEDURE — 1159F MED LIST DOCD IN RCRD: CPT | Performed by: SPECIALIST

## 2024-03-07 NOTE — PROGRESS NOTES
Subjective   HPI: Tre Isidro is a 68 y.o. male is here for suture removal..     ROS: No other skin or systemic complaints other than what is documented elsewhere in the note.    ALLERGIES: Sulfamethoxazole-trimethoprim    SOCIAL:  reports that he has never smoked. He has never used smokeless tobacco. He reports current alcohol use of about 6.0 standard drinks of alcohol per week. He reports that he does not currently use drugs.    Objective     Description: Sutures removed wound is healing very nicely.  No sign of infection is noted.    Assessment/Plan   1. Visit for suture removal  Right Upper Back       Plan: Remove sutures.  Dressing applied.    FOLLOW UP: As needed.    The patient was encouraged to contact me with any further questions or concerns.  Mikal Benitez MD  3/7/2024

## 2024-09-18 ENCOUNTER — OFFICE VISIT (OUTPATIENT)
Dept: PRIMARY CARE | Facility: CLINIC | Age: 69
End: 2024-09-18
Payer: MEDICARE

## 2024-09-18 ENCOUNTER — HOSPITAL ENCOUNTER (OUTPATIENT)
Dept: RADIOLOGY | Facility: CLINIC | Age: 69
Discharge: HOME | End: 2024-09-18
Payer: MEDICARE

## 2024-09-18 VITALS
DIASTOLIC BLOOD PRESSURE: 84 MMHG | BODY MASS INDEX: 23.7 KG/M2 | WEIGHT: 175 LBS | HEART RATE: 78 BPM | OXYGEN SATURATION: 97 % | HEIGHT: 72 IN | SYSTOLIC BLOOD PRESSURE: 136 MMHG

## 2024-09-18 DIAGNOSIS — Z13.220 SCREENING FOR LIPID DISORDERS: ICD-10-CM

## 2024-09-18 DIAGNOSIS — Z00.00 MEDICARE ANNUAL WELLNESS VISIT, INITIAL: Primary | ICD-10-CM

## 2024-09-18 DIAGNOSIS — Z12.5 SCREENING FOR PROSTATE CANCER: ICD-10-CM

## 2024-09-18 DIAGNOSIS — E55.9 VITAMIN D DEFICIENCY: ICD-10-CM

## 2024-09-18 DIAGNOSIS — R53.83 OTHER FATIGUE: ICD-10-CM

## 2024-09-18 DIAGNOSIS — Z71.89 ADVANCED DIRECTIVES, COUNSELING/DISCUSSION: ICD-10-CM

## 2024-09-18 DIAGNOSIS — Z00.00 ROUTINE GENERAL MEDICAL EXAMINATION AT HEALTH CARE FACILITY: ICD-10-CM

## 2024-09-18 DIAGNOSIS — D69.6 THROMBOCYTOPENIA (CMS-HCC): ICD-10-CM

## 2024-09-18 DIAGNOSIS — Z13.89 SCREENING FOR MULTIPLE CONDITIONS: ICD-10-CM

## 2024-09-18 PROCEDURE — 1170F FXNL STATUS ASSESSED: CPT | Performed by: NURSE PRACTITIONER

## 2024-09-18 PROCEDURE — 1159F MED LIST DOCD IN RCRD: CPT | Performed by: NURSE PRACTITIONER

## 2024-09-18 PROCEDURE — 99213 OFFICE O/P EST LOW 20 MIN: CPT | Performed by: NURSE PRACTITIONER

## 2024-09-18 PROCEDURE — 1123F ACP DISCUSS/DSCN MKR DOCD: CPT | Performed by: NURSE PRACTITIONER

## 2024-09-18 PROCEDURE — 99497 ADVNCD CARE PLAN 30 MIN: CPT | Performed by: NURSE PRACTITIONER

## 2024-09-18 PROCEDURE — 71046 X-RAY EXAM CHEST 2 VIEWS: CPT

## 2024-09-18 PROCEDURE — 3008F BODY MASS INDEX DOCD: CPT | Performed by: NURSE PRACTITIONER

## 2024-09-18 PROCEDURE — 1036F TOBACCO NON-USER: CPT | Performed by: NURSE PRACTITIONER

## 2024-09-18 PROCEDURE — G0438 PPPS, INITIAL VISIT: HCPCS | Performed by: NURSE PRACTITIONER

## 2024-09-18 PROCEDURE — 71046 X-RAY EXAM CHEST 2 VIEWS: CPT | Performed by: RADIOLOGY

## 2024-09-18 PROCEDURE — 1158F ADVNC CARE PLAN TLK DOCD: CPT | Performed by: NURSE PRACTITIONER

## 2024-09-18 PROCEDURE — 1160F RVW MEDS BY RX/DR IN RCRD: CPT | Performed by: NURSE PRACTITIONER

## 2024-09-18 ASSESSMENT — ACTIVITIES OF DAILY LIVING (ADL)
DRESSING: INDEPENDENT
GROCERY_SHOPPING: INDEPENDENT
MANAGING_FINANCES: INDEPENDENT
BATHING: INDEPENDENT
TAKING_MEDICATION: INDEPENDENT
DOING_HOUSEWORK: INDEPENDENT

## 2024-09-18 ASSESSMENT — ENCOUNTER SYMPTOMS
OCCASIONAL FEELINGS OF UNSTEADINESS: 0
DEPRESSION: 0
LOSS OF SENSATION IN FEET: 0

## 2024-09-18 ASSESSMENT — PATIENT HEALTH QUESTIONNAIRE - PHQ9
SUM OF ALL RESPONSES TO PHQ9 QUESTIONS 1 AND 2: 0
2. FEELING DOWN, DEPRESSED OR HOPELESS: NOT AT ALL
1. LITTLE INTEREST OR PLEASURE IN DOING THINGS: NOT AT ALL

## 2024-09-18 NOTE — PROGRESS NOTES
"Subjective   Reason for Visit: Tre Isidro is an 68 y.o. male here for a Medicare Wellness visit.     Past Medical, Surgical, and Family History reviewed and updated in chart.    Reviewed all medications by prescribing practitioner or clinical pharmacist (such as prescriptions, OTCs, herbal therapies and supplements) and documented in the medical record.    HPI  Tre presents today for same day sick for c/o cough and fatigue   LOV July 2023    States Aug 26 he started \"not feeling well\"  Had a fever a few days later  Using OTC medications and rest   Fever subsided @ 2 weeks ago        Allergies   Allergen Reactions    Sulfamethoxazole-Trimethoprim Rash       Patient Care Team:  ALLY Dunham as PCP - General (Family Medicine)  ALLY Mukherjee as PCP - [employer] PCP       Review of Systems  ROS was completed and all systems are negative with the exception of what was noted in the the HPI.       Objective   Vitals:  /84   Pulse 78   Ht 1.829 m (6')   Wt 79.4 kg (175 lb)   SpO2 97%   BMI 23.73 kg/m²       Current Outpatient Medications   Medication Instructions    ascorbic acid (Vitamin C) 125 mg chewable tablet 1 tablet, oral, Daily    cholecalciferol (Vitamin D-3) 5,000 Units tablet 1 tablet, oral, Daily    coenzyme Q-10 100 mg capsule 1 capsule, oral, 2 times daily    cyanocobalamin (Vitamin B-12) 1,000 mcg tablet 1 tablet, oral, Daily    famotidine (PEPCID) 20 mg, oral, Nightly, Before bed    fluticasone (Flonase) 50 mcg/actuation nasal spray 2 sprays, Each Nostril, Daily, Shake gently. Before first use, prime pump. After use, clean tip and replace cap.    multivit-min-FA-lycopen-lutein (Essential Man 50 Plus) 0.4-2-250 mg-mg-mcg tablet 1 tablet, oral, Daily    tadalafil (CIALIS) 20 mg, oral         Physical Exam      Assessment & Plan  Medicare annual wellness visit, initial  - Counseled on healthy diet and regular exercise  - Fall avoidance information " provided  - Personalized prevention plan provided   - Colon result pending   - Vaccines UTD   - FBW ordered          Other fatigue  Check labs  Chest Xray today    Orders:    XR chest 2 views; Future    CBC; Future    Comprehensive Metabolic Panel; Future    PSA, Total and Free; Future    TSH with reflex to Free T4 if abnormal; Future    Screening for lipid disorders    Orders:    Lipid Panel; Future    Vitamin D deficiency  Not taking daily Vitamin D     Orders:    Vitamin D 25-Hydroxy,Total (for eval of Vitamin D levels); Future    Thrombocytopenia (CMS-HCC)    Orders:    CBC; Future    Screening for prostate cancer    Orders:    PSA, Total and Free; Future    Advanced directives, counseling/discussion  I spent > 16 minutes face to face with Tre Isidro discussing his/her advance directives, including a Living Will, Healthcare POA as well as specific end of life choices and/or directives, and pt was provided with packet to return next visit.    HCPOA: Spouse   Pt is Full Code         Screening for multiple conditions  Depression screening completed using PHQ-2 questions with results documented in the chart/encounter (15 minutes)  See rooming screening section for documentation and/or progress note for additional information.         Routine general medical examination at health care facility    Orders:    1 Year Follow Up In Primary Care - Wellness Exam; Future

## 2024-09-18 NOTE — ASSESSMENT & PLAN NOTE
I spent > 16 minutes face to face with Tre LORRAINE Isidro discussing his/her advance directives, including a Living Will, Healthcare POA as well as specific end of life choices and/or directives, and pt was provided with packet to return next visit.    HCPOA: Spouse   Pt is Full Code

## 2024-09-18 NOTE — PATIENT INSTRUCTIONS
Thank you for seeing me today Tre Isidro, it was a pleasure to see you again!    Here is your to-do list:     1. Chest Xray today     2. Lab work ordered today.  Please have your blood drawn in the next 1-2 weeks.  You need to be FASTING for 12 hours prior to blood draw.  You may only have water.  Please contact your insurance company to ask about the best location to get blood drawn.  We will contact you with the results of your blood work and any necessary adjustments  to your plan of care, if you do not hear from us within 3-5 days of having your blood drawn, please call the office at [phone].      For assistance with scheduling referrals or consultations, please call [phone] or [phone].    For laboratory, radiology, and other tests, please call [phone] ([phone] for pediatrics).   If you do not get results within 7-10 days, or you have any questions or concerns, please send a message, call the office ([phone]), or return to the office for a follow-up appointment.     For acute/sick visits, if you are unable to get an office visit, you can do a  On Demand Virtual Visit that is accessible via your My Chart account.  For emergencies, call 9-1-1 or go to the nearest Emergency Department.     Please schedule additional appointment(s) to address concern(s) not addressed today.    Please review prescription inserts and published information for possible adverse effects of all medications.     In general, results are discussed over the phone or via  [employer].     You can see your health information, review clinical summaries from office visits & test results online when you follow your health with MY  Chart, a personal health record.   To sign up go to [email or web address].   If you need assistance with signing up or trouble getting into your account call [facility] Patient Line 24/7 at [phone]     RTC AS NEEDED     Take Care,   Radha Morin

## 2024-09-18 NOTE — ASSESSMENT & PLAN NOTE
Not taking daily Vitamin D     Orders:    Vitamin D 25-Hydroxy,Total (for eval of Vitamin D levels); Future

## 2024-12-02 ENCOUNTER — APPOINTMENT (OUTPATIENT)
Dept: UROLOGY | Facility: CLINIC | Age: 69
End: 2024-12-02
Payer: MEDICARE

## 2024-12-02 VITALS — TEMPERATURE: 96.7 F

## 2024-12-02 DIAGNOSIS — R39.15 URINARY URGENCY: ICD-10-CM

## 2024-12-02 DIAGNOSIS — N40.1 BPH WITH OBSTRUCTION/LOWER URINARY TRACT SYMPTOMS: Primary | ICD-10-CM

## 2024-12-02 DIAGNOSIS — N13.8 BPH WITH OBSTRUCTION/LOWER URINARY TRACT SYMPTOMS: Primary | ICD-10-CM

## 2024-12-02 LAB
POC APPEARANCE, URINE: CLEAR
POC BILIRUBIN, URINE: NEGATIVE
POC BLOOD, URINE: ABNORMAL
POC COLOR, URINE: YELLOW
POC GLUCOSE, URINE: NEGATIVE MG/DL
POC KETONES, URINE: NEGATIVE MG/DL
POC LEUKOCYTES, URINE: NEGATIVE
POC NITRITE,URINE: NEGATIVE
POC PH, URINE: 7 PH
POC PROTEIN, URINE: NEGATIVE MG/DL
POC SPECIFIC GRAVITY, URINE: 1.01
POC UROBILINOGEN, URINE: 0.2 EU/DL

## 2024-12-02 PROCEDURE — 1036F TOBACCO NON-USER: CPT | Performed by: UROLOGY

## 2024-12-02 PROCEDURE — G2211 COMPLEX E/M VISIT ADD ON: HCPCS | Performed by: UROLOGY

## 2024-12-02 PROCEDURE — 1123F ACP DISCUSS/DSCN MKR DOCD: CPT | Performed by: UROLOGY

## 2024-12-02 PROCEDURE — 51798 US URINE CAPACITY MEASURE: CPT | Performed by: UROLOGY

## 2024-12-02 PROCEDURE — 1126F AMNT PAIN NOTED NONE PRSNT: CPT | Performed by: UROLOGY

## 2024-12-02 PROCEDURE — 1159F MED LIST DOCD IN RCRD: CPT | Performed by: UROLOGY

## 2024-12-02 PROCEDURE — 99213 OFFICE O/P EST LOW 20 MIN: CPT | Performed by: UROLOGY

## 2024-12-02 PROCEDURE — 81003 URINALYSIS AUTO W/O SCOPE: CPT | Performed by: UROLOGY

## 2024-12-02 ASSESSMENT — PAIN SCALES - GENERAL: PAINLEVEL_OUTOF10: 0-NO PAIN

## 2024-12-02 NOTE — PROGRESS NOTES
Subjective   Tre Isidro is a 68 y.o. male with history of BPH with LUTs and incomplete bladder emptying; presenting today for a follow up visit. A prostate MRI was ordered for further evaluation of prostate anatomy, however, patient did not have this done. Patient has bothersome urinary symptoms. He states his symptoms have worsened since his last visit. He is mostly bothered by incomplete bladder emptying, frequency, urgency, and straining to urinate. He has nocturia x2. He also reports occasional discomfort with urination and ejaculation. Denies any recent gross hematuria, fevers, chills, urinary retention, intractable flank or abdominal pain, nausea or vomiting.              LAST VISIT (8/22/2023):  67 year old male presents today as a new patient for , kindly referred by Dr. Leal. Early July he developed UTI symptoms including burning with urination, dysuria, and an increase in urinary urgency and frequency. His urine culture was negative. He was treated for suspected prostatitis with Amoxicillin and Ciprofloxacin. He notices that his symptoms are worse when he is at work vs working remote. He voids roughly every 2-3 hours during the day. Reports nocturia x1 but does not feel sleep deprived. He does have urinary urgency and had a few episodes of urge incontinence. He will have occasional weak stream at night. He feels empty after voiding. Patient denies gross hematuria, fever, and chills. Patient denies a history of kidney stones. PSA total was 1.3 on 08/02/23. He denies any family history of prostate cancer. Patient is a non smoker. His initial voiding residual was 500 and second .       Past Medical History:   Diagnosis Date    Personal history of other diseases of the respiratory system     History of acute pulmonary edema    Personal history of other endocrine, nutritional and metabolic disease     History of hyperlipidemia    Personal history of other infectious and parasitic diseases      History of onychomycosis     Past Surgical History:   Procedure Laterality Date    WISDOM TOOTH EXTRACTION  1970     Family History   Problem Relation Name Age of Onset    Arthritis Mother Rose Marie         Mother  at age 80    Diabetes Mother Rose Marie     Stroke Mother Rose Marie     Heart failure Mother Rose Marie     Osteoarthritis Mother Rose Marie     Heart disease Father Rajinder     Heart attack Father Rajinder     Colon cancer Other family HX     Prostate cancer Other family HX      Current Outpatient Medications   Medication Sig Dispense Refill    ascorbic acid (Vitamin C) 125 mg chewable tablet Chew 1 tablet (125 mg) once daily.      cholecalciferol (Vitamin D-3) 5,000 Units tablet Take 1 tablet (5,000 Units) by mouth once daily.      coenzyme Q-10 100 mg capsule Take 1 capsule (100 mg) by mouth 2 times a day.      cyanocobalamin (Vitamin B-12) 1,000 mcg tablet Take 1 tablet (1,000 mcg) by mouth once daily.      multivit-min-FA-lycopen-lutein (Essential Man 50 Plus) 0.4-2-250 mg-mg-mcg tablet Take 1 tablet by mouth once daily.      tadalafil 20 mg tablet Take 1 tablet (20 mg) by mouth.       No current facility-administered medications for this visit.     Allergies   Allergen Reactions    Sulfamethoxazole-Trimethoprim Rash     Social History     Socioeconomic History    Marital status:      Spouse name: Not on file    Number of children: Not on file    Years of education: Not on file    Highest education level: Not on file   Occupational History    Not on file   Tobacco Use    Smoking status: Never     Passive exposure: Never    Smokeless tobacco: Never   Vaping Use    Vaping status: Never Used   Substance and Sexual Activity    Alcohol use: Yes     Alcohol/week: 4.0 standard drinks of alcohol     Types: 4 Glasses of wine per week    Drug use: Not Currently    Sexual activity: Yes     Partners: Female   Other Topics Concern    Not on file   Social History Narrative    Not on file     Social Drivers of Health      Financial Resource Strain: Not on file   Food Insecurity: Not on file   Transportation Needs: Not on file   Physical Activity: Not on file   Stress: Not on file   Social Connections: Not on file   Intimate Partner Violence: Not on file   Housing Stability: Not on file       Review of Systems  Pertinent items are noted in HPI.    Objective       Lab Review  Lab Results   Component Value Date    WBC 5.1 08/29/2023    RBC 4.57 08/29/2023    HGB 14.7 08/29/2023    HCT 44.7 08/29/2023     (L) 08/29/2023      Lab Results   Component Value Date    BUN 8 08/02/2023    CREATININE 0.75 08/02/2023      Lab Results   Component Value Date    PSA 1.3 08/02/2023   IPSS 20 and 4  PLO=[id number]      Urine analysis shows +blood       Assessment/Plan   There are no diagnoses linked to this encounter.    BPH with LUTS - incomplete bladder emptying     We will obtain a prostate MRI to assess prostate anatomy in anticipation of fusion biopsy if indicated.    Follow up virtually to review.     All questions were answered to the patient's satisfaction. Patient agrees with the plan and wishes to proceed. Follow-up will be scheduled appropriately.     E&M visit today is associated with current or anticipated ongoing medical care services related to a patient's single, serious condition or a complex condition.    Scribed for Dr. Baltazar by Ruth Gayle. I , Dr Baltazar, have personally reviewed and agreed with the information entered by the Virtual Scribe.

## 2024-12-30 ENCOUNTER — APPOINTMENT (OUTPATIENT)
Dept: RADIOLOGY | Facility: HOSPITAL | Age: 69
End: 2024-12-30
Payer: MEDICARE

## 2025-01-07 ENCOUNTER — APPOINTMENT (OUTPATIENT)
Dept: UROLOGY | Facility: CLINIC | Age: 70
End: 2025-01-07
Payer: MEDICARE

## 2025-01-13 ENCOUNTER — HOSPITAL ENCOUNTER (OUTPATIENT)
Dept: RADIOLOGY | Facility: HOSPITAL | Age: 70
Discharge: HOME | End: 2025-01-13
Payer: MEDICARE

## 2025-01-13 DIAGNOSIS — N13.8 BPH WITH OBSTRUCTION/LOWER URINARY TRACT SYMPTOMS: ICD-10-CM

## 2025-01-13 DIAGNOSIS — N40.1 BPH WITH OBSTRUCTION/LOWER URINARY TRACT SYMPTOMS: ICD-10-CM

## 2025-01-13 PROCEDURE — A9575 INJ GADOTERATE MEGLUMI 0.1ML: HCPCS | Performed by: UROLOGY

## 2025-01-13 PROCEDURE — 72197 MRI PELVIS W/O & W/DYE: CPT

## 2025-01-13 PROCEDURE — 2550000001 HC RX 255 CONTRASTS: Performed by: UROLOGY

## 2025-01-13 RX ORDER — GADOTERATE MEGLUMINE 376.9 MG/ML
15 INJECTION INTRAVENOUS
Status: COMPLETED | OUTPATIENT
Start: 2025-01-13 | End: 2025-01-13

## 2025-01-13 RX ADMIN — GADOTERATE MEGLUMINE 15 ML: 376.9 INJECTION INTRAVENOUS at 19:15

## 2025-01-15 ENCOUNTER — APPOINTMENT (OUTPATIENT)
Dept: UROLOGY | Facility: CLINIC | Age: 70
End: 2025-01-15
Payer: MEDICARE

## 2025-01-15 DIAGNOSIS — K56.41: ICD-10-CM

## 2025-01-15 DIAGNOSIS — N13.8 BPH WITH OBSTRUCTION/LOWER URINARY TRACT SYMPTOMS: ICD-10-CM

## 2025-01-15 DIAGNOSIS — N40.1 BPH WITH OBSTRUCTION/LOWER URINARY TRACT SYMPTOMS: ICD-10-CM

## 2025-01-15 PROCEDURE — 1123F ACP DISCUSS/DSCN MKR DOCD: CPT | Performed by: UROLOGY

## 2025-01-15 PROCEDURE — 99214 OFFICE O/P EST MOD 30 MIN: CPT | Performed by: UROLOGY

## 2025-01-15 PROCEDURE — G2211 COMPLEX E/M VISIT ADD ON: HCPCS | Performed by: UROLOGY

## 2025-01-15 NOTE — PROGRESS NOTES
Subjective     This visit was completed via telemedicine. All issues as below were discussed and addressed but no physical exam was performed unless allowed by visual confirmation. If it was felt that the patient should be evaluated in clinic, then they were directed there. Patient verbally consented to visit.    Tre Isidro is a 69 y.o. male with history of BPH with LUTs and incomplete bladder emptying; presenting today for a follow up visit to review prostate MRI.     Patient has bothersome urinary symptoms. He states his symptoms have worsened since his last visit. He is mostly bothered by incomplete bladder emptying, frequency, urgency, and straining to urinate. He has nocturia x2. He also reports occasional discomfort with urination and ejaculation. Denies any recent gross hematuria, fevers, chills, urinary retention, intractable flank or abdominal pain, nausea or vomiting.              LAST VISIT (8/22/2023):  67 year old male presents today as a new patient for , kindly referred by Dr. Leal. Early July he developed UTI symptoms including burning with urination, dysuria, and an increase in urinary urgency and frequency. His urine culture was negative. He was treated for suspected prostatitis with Amoxicillin and Ciprofloxacin. He notices that his symptoms are worse when he is at work vs working remote. He voids roughly every 2-3 hours during the day. Reports nocturia x1 but does not feel sleep deprived. He does have urinary urgency and had a few episodes of urge incontinence. He will have occasional weak stream at night. He feels empty after voiding. Patient denies gross hematuria, fever, and chills. Patient denies a history of kidney stones. PSA total was 1.3 on 08/02/23. He denies any family history of prostate cancer. Patient is a non smoker. His initial voiding residual was 500 and second .       Past Medical History:   Diagnosis Date    Personal history of other diseases of the  respiratory system     History of acute pulmonary edema    Personal history of other endocrine, nutritional and metabolic disease     History of hyperlipidemia    Personal history of other infectious and parasitic diseases     History of onychomycosis     Past Surgical History:   Procedure Laterality Date    WISDOM TOOTH EXTRACTION       Family History   Problem Relation Name Age of Onset    Arthritis Mother Rose Marie         Mother  at age 80    Diabetes Mother Rose Marie     Stroke Mother Rose Marie     Heart failure Mother Rose Marie     Osteoarthritis Mother Rose Marie     Heart disease Father Rajinder     Heart attack Father Rajinder     Colon cancer Other family HX     Prostate cancer Other family HX      Current Outpatient Medications   Medication Sig Dispense Refill    ascorbic acid (Vitamin C) 125 mg chewable tablet Chew 1 tablet (125 mg) once daily.      cholecalciferol (Vitamin D-3) 5,000 Units tablet Take 1 tablet (5,000 Units) by mouth once daily.      coenzyme Q-10 100 mg capsule Take 1 capsule (100 mg) by mouth 2 times a day.      cyanocobalamin (Vitamin B-12) 1,000 mcg tablet Take 1 tablet (1,000 mcg) by mouth once daily.      multivit-min-FA-lycopen-lutein (Essential Man 50 Plus) 0.4-2-250 mg-mg-mcg tablet Take 1 tablet by mouth once daily.      tadalafil 20 mg tablet Take 1 tablet (20 mg) by mouth.       No current facility-administered medications for this visit.     Allergies   Allergen Reactions    Sulfamethoxazole-Trimethoprim Rash     Social History     Socioeconomic History    Marital status:      Spouse name: Not on file    Number of children: Not on file    Years of education: Not on file    Highest education level: Not on file   Occupational History    Not on file   Tobacco Use    Smoking status: Never     Passive exposure: Never    Smokeless tobacco: Never   Vaping Use    Vaping status: Never Used   Substance and Sexual Activity    Alcohol use: Yes     Alcohol/week: 4.0 standard drinks of alcohol      Types: 4 Glasses of wine per week    Drug use: Not Currently    Sexual activity: Yes     Partners: Female   Other Topics Concern    Not on file   Social History Narrative    Not on file     Social Drivers of Health     Financial Resource Strain: Not on file   Food Insecurity: Not on file   Transportation Needs: Not on file   Physical Activity: Not on file   Stress: Not on file   Social Connections: Not on file   Intimate Partner Violence: Not on file   Housing Stability: Not on file       Review of Systems  Pertinent items are noted in HPI.    Objective       Lab Review  Lab Results   Component Value Date    WBC 5.1 08/29/2023    RBC 4.57 08/29/2023    HGB 14.7 08/29/2023    HCT 44.7 08/29/2023     (L) 08/29/2023      Lab Results   Component Value Date    BUN 8 08/02/2023    CREATININE 0.75 08/02/2023      Lab Results   Component Value Date    PSA 1.3 08/02/2023         Assessment/Plan   Diagnoses and all orders for this visit:  BPH with obstruction/lower urinary tract symptoms  Impacted stool in rectum (Multi)      BPH with LUTS - incomplete bladder emptying     I reviewed prostate MRI from 1/13/2025 which showed a 9.9 cc prostate with a PI-RADS 4 lesion within the right mid gland peripheral zone. PI-RADS 4 lesion within the left mid gland peripheral zone. Large stool ball noted within the rectum.     I recommend proceeding with fusion guided prostate biopsy.     I reviewed with him the risks of fusion guided prostate biopsy which include hematuria, rectal bleeding, UTI, urosepsis, urinary retention, discomfort, and missing prostate cancer diagnosis. Antibiotic will be administered prior to the procedure to minimize those risks. This will be scheduled with Dr. Alcocer under sedation.     Impacted Stool    I advised patient to follow up with PCP for further management. Advised to start with an enema and Mag Citrate      All questions were answered to the patient's satisfaction. Patient agrees with the plan  and wishes to proceed. Follow-up will be scheduled appropriately.     E&M visit today is associated with current or anticipated ongoing medical care services related to a patient's single, serious condition or a complex condition.    Scribed for Dr. Baltazar by uRth Gayle. I , Dr Baltazar, have personally reviewed and agreed with the information entered by the Virtual Scribe.

## 2025-01-20 ENCOUNTER — TELEPHONE (OUTPATIENT)
Dept: UROLOGY | Facility: CLINIC | Age: 70
End: 2025-01-20
Payer: MEDICARE

## 2025-01-20 NOTE — TELEPHONE ENCOUNTER
Left voicemail for patient 1/16 and again 1/20 to contact the office to schedule fusion prostate biopsy. Call back number provided in both voicemails.

## 2025-01-23 ENCOUNTER — PREP FOR PROCEDURE (OUTPATIENT)
Dept: UROLOGY | Facility: CLINIC | Age: 70
End: 2025-01-23
Payer: MEDICARE

## 2025-01-23 DIAGNOSIS — N33 BLADDER DISORDERS IN DISEASES CLASSIFIED ELSEWHERE: ICD-10-CM

## 2025-01-23 DIAGNOSIS — N40.1 BPH WITH OBSTRUCTION/LOWER URINARY TRACT SYMPTOMS: ICD-10-CM

## 2025-01-23 DIAGNOSIS — R97.20 ELEVATED PSA: Primary | ICD-10-CM

## 2025-01-23 DIAGNOSIS — N13.8 BPH WITH OBSTRUCTION/LOWER URINARY TRACT SYMPTOMS: ICD-10-CM

## 2025-01-23 DIAGNOSIS — R39.15 URINARY URGENCY: ICD-10-CM

## 2025-01-23 DIAGNOSIS — Z01.818 PREOP TESTING: ICD-10-CM

## 2025-01-23 RX ORDER — CEFAZOLIN SODIUM 2 G/100ML
2 INJECTION, SOLUTION INTRAVENOUS ONCE
OUTPATIENT
Start: 2025-01-23 | End: 2025-01-23

## 2025-01-24 PROBLEM — R97.20 ELEVATED PSA: Status: ACTIVE | Noted: 2025-01-23

## 2025-02-12 ENCOUNTER — ANESTHESIA EVENT (OUTPATIENT)
Dept: OPERATING ROOM | Facility: CLINIC | Age: 70
End: 2025-02-12
Payer: MEDICARE

## 2025-02-13 LAB
ANION GAP SERPL CALCULATED.4IONS-SCNC: 9 MMOL/L (CALC) (ref 7–17)
BACTERIA UR CULT: NORMAL
BUN SERPL-MCNC: 12 MG/DL (ref 7–25)
BUN/CREAT SERPL: ABNORMAL (CALC) (ref 6–22)
CALCIUM SERPL-MCNC: 9.4 MG/DL (ref 8.6–10.3)
CHLORIDE SERPL-SCNC: 103 MMOL/L (ref 98–110)
CO2 SERPL-SCNC: 28 MMOL/L (ref 20–32)
CREAT SERPL-MCNC: 0.73 MG/DL (ref 0.7–1.35)
EGFRCR SERPLBLD CKD-EPI 2021: 100 ML/MIN/1.73M2
ERYTHROCYTE [DISTWIDTH] IN BLOOD BY AUTOMATED COUNT: 12 % (ref 11–15)
GLUCOSE SERPL-MCNC: 104 MG/DL (ref 65–99)
HCT VFR BLD AUTO: 46 % (ref 38.5–50)
HGB BLD-MCNC: 15.5 G/DL (ref 13.2–17.1)
INR PPP: 1
MCH RBC QN AUTO: 32.8 PG (ref 27–33)
MCHC RBC AUTO-ENTMCNC: 33.7 G/DL (ref 32–36)
MCV RBC AUTO: 97.3 FL (ref 80–100)
PLATELET # BLD AUTO: 160 THOUSAND/UL (ref 140–400)
PMV BLD REES-ECKER: 13.9 FL (ref 7.5–12.5)
POTASSIUM SERPL-SCNC: 4.7 MMOL/L (ref 3.5–5.3)
PROTHROMBIN TIME: 10.6 SEC (ref 9–11.5)
RBC # BLD AUTO: 4.73 MILLION/UL (ref 4.2–5.8)
SODIUM SERPL-SCNC: 140 MMOL/L (ref 135–146)
WBC # BLD AUTO: 6.9 THOUSAND/UL (ref 3.8–10.8)

## 2025-02-19 RX ORDER — BISMUTH SUBSALICYLATE 262 MG
1 TABLET,CHEWABLE ORAL DAILY
COMMUNITY

## 2025-02-21 LAB
25(OH)D3+25(OH)D2 SERPL-MCNC: 58 NG/ML (ref 30–100)
ALBUMIN SERPL-MCNC: 4.6 G/DL (ref 3.6–5.1)
ALP SERPL-CCNC: 60 U/L (ref 35–144)
ALT SERPL-CCNC: 18 U/L (ref 9–46)
ANION GAP SERPL CALCULATED.4IONS-SCNC: 8 MMOL/L (CALC) (ref 7–17)
AST SERPL-CCNC: 24 U/L (ref 10–35)
BILIRUB SERPL-MCNC: 0.7 MG/DL (ref 0.2–1.2)
BUN SERPL-MCNC: 11 MG/DL (ref 7–25)
CALCIUM SERPL-MCNC: 9.6 MG/DL (ref 8.6–10.3)
CHLORIDE SERPL-SCNC: 105 MMOL/L (ref 98–110)
CHOLEST SERPL-MCNC: 228 MG/DL
CHOLEST/HDLC SERPL: 4.1 (CALC)
CO2 SERPL-SCNC: 28 MMOL/L (ref 20–32)
CREAT SERPL-MCNC: 0.64 MG/DL (ref 0.7–1.35)
EGFRCR SERPLBLD CKD-EPI 2021: 104 ML/MIN/1.73M2
ERYTHROCYTE [DISTWIDTH] IN BLOOD BY AUTOMATED COUNT: 12.1 % (ref 11–15)
GLUCOSE SERPL-MCNC: 85 MG/DL (ref 65–99)
HCT VFR BLD AUTO: 45.3 % (ref 38.5–50)
HDLC SERPL-MCNC: 56 MG/DL
HGB BLD-MCNC: 15.2 G/DL (ref 13.2–17.1)
LDLC SERPL CALC-MCNC: 143 MG/DL (CALC)
MCH RBC QN AUTO: 32.4 PG (ref 27–33)
MCHC RBC AUTO-ENTMCNC: 33.6 G/DL (ref 32–36)
MCV RBC AUTO: 96.6 FL (ref 80–100)
NONHDLC SERPL-MCNC: 172 MG/DL (CALC)
PLATELET # BLD AUTO: 147 THOUSAND/UL (ref 140–400)
PMV BLD REES-ECKER: 14.3 FL (ref 7.5–12.5)
POTASSIUM SERPL-SCNC: 4.9 MMOL/L (ref 3.5–5.3)
PROT SERPL-MCNC: 7.1 G/DL (ref 6.1–8.1)
PSA FREE MFR SERPL: 19 % (CALC)
PSA FREE SERPL-MCNC: 0.3 NG/ML
PSA SERPL-MCNC: 1.6 NG/ML
RBC # BLD AUTO: 4.69 MILLION/UL (ref 4.2–5.8)
SODIUM SERPL-SCNC: 141 MMOL/L (ref 135–146)
TRIGL SERPL-MCNC: 155 MG/DL
TSH SERPL-ACNC: 0.72 MIU/L (ref 0.4–4.5)
WBC # BLD AUTO: 6.4 THOUSAND/UL (ref 3.8–10.8)

## 2025-02-21 NOTE — RESULT ENCOUNTER NOTE
Tre Isidro    I have reviewed all of your blood work and below are my recommendations:    1. Your LDLs (bad cholesterol) were higher than normal.    Please try to exercise regularly: at least 150 minutes/week  Cut back on foods high in trans fat and saturated fats, like red meats, creams, cheese, and butter  Limit sweets and salt   Our goal is to have your LDL's < 70    2. The American Heart Association recommends that people who have high triglycerides limit their intake of saturated fat, added sugar, and salt and increase their intake of whole grains, fruits, lean meats, legumes, fat-free or low-fat dairy, seafood, poultry, nuts, and non-starchy vegetables.      Your kidney and liver function were normal.    Your prostate cancer screening was negative     Your Vitamin D level was normal     Your thyroid function was normal     If you have any questions, please feel free to call my office.    Take Care,   Radha

## 2025-02-26 ENCOUNTER — ANESTHESIA (OUTPATIENT)
Dept: OPERATING ROOM | Facility: CLINIC | Age: 70
End: 2025-02-26
Payer: MEDICARE

## 2025-02-26 ENCOUNTER — HOSPITAL ENCOUNTER (OUTPATIENT)
Facility: CLINIC | Age: 70
Setting detail: OUTPATIENT SURGERY
Discharge: HOME | End: 2025-02-26
Attending: STUDENT IN AN ORGANIZED HEALTH CARE EDUCATION/TRAINING PROGRAM | Admitting: STUDENT IN AN ORGANIZED HEALTH CARE EDUCATION/TRAINING PROGRAM
Payer: MEDICARE

## 2025-02-26 VITALS
WEIGHT: 179.23 LBS | OXYGEN SATURATION: 97 % | HEART RATE: 75 BPM | DIASTOLIC BLOOD PRESSURE: 65 MMHG | TEMPERATURE: 97.2 F | RESPIRATION RATE: 16 BRPM | HEIGHT: 73 IN | SYSTOLIC BLOOD PRESSURE: 109 MMHG | BODY MASS INDEX: 23.75 KG/M2

## 2025-02-26 DIAGNOSIS — R97.20 ELEVATED PSA: ICD-10-CM

## 2025-02-26 PROCEDURE — 2500000004 HC RX 250 GENERAL PHARMACY W/ HCPCS (ALT 636 FOR OP/ED): Performed by: NURSE ANESTHETIST, CERTIFIED REGISTERED

## 2025-02-26 PROCEDURE — 2500000004 HC RX 250 GENERAL PHARMACY W/ HCPCS (ALT 636 FOR OP/ED): Performed by: STUDENT IN AN ORGANIZED HEALTH CARE EDUCATION/TRAINING PROGRAM

## 2025-02-26 PROCEDURE — 2720000007 HC OR 272 NO HCPCS: Performed by: STUDENT IN AN ORGANIZED HEALTH CARE EDUCATION/TRAINING PROGRAM

## 2025-02-26 PROCEDURE — 55700 PR PROSTATE NEEDLE BIOPSY ANY APPROACH: CPT | Performed by: STUDENT IN AN ORGANIZED HEALTH CARE EDUCATION/TRAINING PROGRAM

## 2025-02-26 PROCEDURE — A55706 PR BIOPSY OF PROSTATE,NEEDLE,TRANSPERINEAL: Performed by: NURSE ANESTHETIST, CERTIFIED REGISTERED

## 2025-02-26 PROCEDURE — 7100000010 HC PHASE TWO TIME - EACH INCREMENTAL 1 MINUTE: Performed by: STUDENT IN AN ORGANIZED HEALTH CARE EDUCATION/TRAINING PROGRAM

## 2025-02-26 PROCEDURE — 76872 US TRANSRECTAL: CPT | Performed by: STUDENT IN AN ORGANIZED HEALTH CARE EDUCATION/TRAINING PROGRAM

## 2025-02-26 PROCEDURE — 2500000005 HC RX 250 GENERAL PHARMACY W/O HCPCS: Performed by: STUDENT IN AN ORGANIZED HEALTH CARE EDUCATION/TRAINING PROGRAM

## 2025-02-26 PROCEDURE — 3600000004 HC OR TIME - INITIAL BASE CHARGE - PROCEDURE LEVEL FOUR: Performed by: STUDENT IN AN ORGANIZED HEALTH CARE EDUCATION/TRAINING PROGRAM

## 2025-02-26 PROCEDURE — A55706 PR BIOPSY OF PROSTATE,NEEDLE,TRANSPERINEAL: Performed by: ANESTHESIOLOGY

## 2025-02-26 PROCEDURE — G0416 PROSTATE BIOPSY, ANY MTHD: HCPCS | Mod: TC,SUR | Performed by: STUDENT IN AN ORGANIZED HEALTH CARE EDUCATION/TRAINING PROGRAM

## 2025-02-26 PROCEDURE — 7100000009 HC PHASE TWO TIME - INITIAL BASE CHARGE: Performed by: STUDENT IN AN ORGANIZED HEALTH CARE EDUCATION/TRAINING PROGRAM

## 2025-02-26 PROCEDURE — C1819 TISSUE LOCALIZATION-EXCISION: HCPCS | Performed by: STUDENT IN AN ORGANIZED HEALTH CARE EDUCATION/TRAINING PROGRAM

## 2025-02-26 PROCEDURE — 3700000001 HC GENERAL ANESTHESIA TIME - INITIAL BASE CHARGE: Performed by: STUDENT IN AN ORGANIZED HEALTH CARE EDUCATION/TRAINING PROGRAM

## 2025-02-26 PROCEDURE — 2500000004 HC RX 250 GENERAL PHARMACY W/ HCPCS (ALT 636 FOR OP/ED)

## 2025-02-26 PROCEDURE — 3600000009 HC OR TIME - EACH INCREMENTAL 1 MINUTE - PROCEDURE LEVEL FOUR: Performed by: STUDENT IN AN ORGANIZED HEALTH CARE EDUCATION/TRAINING PROGRAM

## 2025-02-26 PROCEDURE — 3700000002 HC GENERAL ANESTHESIA TIME - EACH INCREMENTAL 1 MINUTE: Performed by: STUDENT IN AN ORGANIZED HEALTH CARE EDUCATION/TRAINING PROGRAM

## 2025-02-26 RX ORDER — FENTANYL CITRATE 50 UG/ML
25 INJECTION, SOLUTION INTRAMUSCULAR; INTRAVENOUS EVERY 5 MIN PRN
Status: DISCONTINUED | OUTPATIENT
Start: 2025-02-26 | End: 2025-02-26 | Stop reason: HOSPADM

## 2025-02-26 RX ORDER — KETOROLAC TROMETHAMINE 30 MG/ML
INJECTION, SOLUTION INTRAMUSCULAR; INTRAVENOUS AS NEEDED
Status: DISCONTINUED | OUTPATIENT
Start: 2025-02-26 | End: 2025-02-26

## 2025-02-26 RX ORDER — FENTANYL CITRATE 50 UG/ML
50 INJECTION, SOLUTION INTRAMUSCULAR; INTRAVENOUS EVERY 5 MIN PRN
Status: DISCONTINUED | OUTPATIENT
Start: 2025-02-26 | End: 2025-02-26 | Stop reason: HOSPADM

## 2025-02-26 RX ORDER — BUPIVACAINE HYDROCHLORIDE 2.5 MG/ML
INJECTION, SOLUTION INFILTRATION; PERINEURAL AS NEEDED
Status: DISCONTINUED | OUTPATIENT
Start: 2025-02-26 | End: 2025-02-26 | Stop reason: HOSPADM

## 2025-02-26 RX ORDER — OXYCODONE HYDROCHLORIDE 5 MG/1
5 TABLET ORAL EVERY 4 HOURS PRN
Status: DISCONTINUED | OUTPATIENT
Start: 2025-02-26 | End: 2025-02-26 | Stop reason: HOSPADM

## 2025-02-26 RX ORDER — LIDOCAINE HYDROCHLORIDE 10 MG/ML
0.1 INJECTION, SOLUTION EPIDURAL; INFILTRATION; INTRACAUDAL; PERINEURAL ONCE
Status: DISCONTINUED | OUTPATIENT
Start: 2025-02-26 | End: 2025-02-26 | Stop reason: HOSPADM

## 2025-02-26 RX ORDER — MIDAZOLAM HYDROCHLORIDE 1 MG/ML
INJECTION, SOLUTION INTRAMUSCULAR; INTRAVENOUS AS NEEDED
Status: DISCONTINUED | OUTPATIENT
Start: 2025-02-26 | End: 2025-02-26

## 2025-02-26 RX ORDER — LABETALOL HYDROCHLORIDE 5 MG/ML
5 INJECTION, SOLUTION INTRAVENOUS ONCE AS NEEDED
Status: DISCONTINUED | OUTPATIENT
Start: 2025-02-26 | End: 2025-02-26 | Stop reason: HOSPADM

## 2025-02-26 RX ORDER — ACETAMINOPHEN 325 MG/1
650 TABLET ORAL EVERY 4 HOURS PRN
Status: DISCONTINUED | OUTPATIENT
Start: 2025-02-26 | End: 2025-02-26 | Stop reason: HOSPADM

## 2025-02-26 RX ORDER — ALBUTEROL SULFATE 0.83 MG/ML
2.5 SOLUTION RESPIRATORY (INHALATION) ONCE AS NEEDED
Status: DISCONTINUED | OUTPATIENT
Start: 2025-02-26 | End: 2025-02-26 | Stop reason: HOSPADM

## 2025-02-26 RX ORDER — ONDANSETRON HYDROCHLORIDE 2 MG/ML
4 INJECTION, SOLUTION INTRAVENOUS ONCE AS NEEDED
Status: DISCONTINUED | OUTPATIENT
Start: 2025-02-26 | End: 2025-02-26 | Stop reason: HOSPADM

## 2025-02-26 RX ORDER — METOCLOPRAMIDE HYDROCHLORIDE 5 MG/ML
10 INJECTION INTRAMUSCULAR; INTRAVENOUS ONCE AS NEEDED
Status: DISCONTINUED | OUTPATIENT
Start: 2025-02-26 | End: 2025-02-26 | Stop reason: HOSPADM

## 2025-02-26 RX ORDER — PROPOFOL 10 MG/ML
INJECTION, EMULSION INTRAVENOUS CONTINUOUS PRN
Status: DISCONTINUED | OUTPATIENT
Start: 2025-02-26 | End: 2025-02-26

## 2025-02-26 RX ORDER — CEFTRIAXONE 2 G/50ML
2 INJECTION, SOLUTION INTRAVENOUS ONCE
Status: COMPLETED | OUTPATIENT
Start: 2025-02-26 | End: 2025-02-26

## 2025-02-26 RX ORDER — FENTANYL CITRATE 50 UG/ML
INJECTION, SOLUTION INTRAMUSCULAR; INTRAVENOUS AS NEEDED
Status: DISCONTINUED | OUTPATIENT
Start: 2025-02-26 | End: 2025-02-26

## 2025-02-26 RX ADMIN — FENTANYL CITRATE 50 MCG: 0.05 INJECTION, SOLUTION INTRAMUSCULAR; INTRAVENOUS at 08:31

## 2025-02-26 RX ADMIN — SODIUM CHLORIDE: 9 INJECTION, SOLUTION INTRAVENOUS at 08:26

## 2025-02-26 RX ADMIN — FENTANYL CITRATE 50 MCG: 0.05 INJECTION, SOLUTION INTRAMUSCULAR; INTRAVENOUS at 08:26

## 2025-02-26 RX ADMIN — KETOROLAC TROMETHAMINE 15 MG: 30 INJECTION, SOLUTION INTRAMUSCULAR; INTRAVENOUS at 08:54

## 2025-02-26 RX ADMIN — PROPOFOL 140 MCG/KG/MIN: 10 INJECTION, EMULSION INTRAVENOUS at 08:31

## 2025-02-26 RX ADMIN — MIDAZOLAM 2 MG: 1 INJECTION INTRAMUSCULAR; INTRAVENOUS at 08:26

## 2025-02-26 RX ADMIN — CEFTRIAXONE SODIUM 2 G: 2 INJECTION, SOLUTION INTRAVENOUS at 08:12

## 2025-02-26 SDOH — HEALTH STABILITY: MENTAL HEALTH: CURRENT SMOKER: 0

## 2025-02-26 ASSESSMENT — PAIN SCALES - GENERAL
PAINLEVEL_OUTOF10: 0 - NO PAIN
PAINLEVEL_OUTOF10: 0 - NO PAIN
PAIN_LEVEL: 0
PAINLEVEL_OUTOF10: 0 - NO PAIN

## 2025-02-26 ASSESSMENT — PAIN - FUNCTIONAL ASSESSMENT
PAIN_FUNCTIONAL_ASSESSMENT: 0-10

## 2025-02-26 ASSESSMENT — COLUMBIA-SUICIDE SEVERITY RATING SCALE - C-SSRS
1. IN THE PAST MONTH, HAVE YOU WISHED YOU WERE DEAD OR WISHED YOU COULD GO TO SLEEP AND NOT WAKE UP?: NO
6. HAVE YOU EVER DONE ANYTHING, STARTED TO DO ANYTHING, OR PREPARED TO DO ANYTHING TO END YOUR LIFE?: NO
2. HAVE YOU ACTUALLY HAD ANY THOUGHTS OF KILLING YOURSELF?: NO

## 2025-02-26 NOTE — ANESTHESIA PREPROCEDURE EVALUATION
Patient: Tre Isidro    Procedure Information       Date/Time: 02/26/25 0830    Procedure: Biopsy Prostate with Navigation    Location: [facility] [place] [facility] OR 01 / Virtual [facility] [place] [facility] OR    Surgeons: Ted Alcocer MD            Relevant Problems   Anesthesia (within normal limits)      Cardiac   (+) Hyperlipidemia      Pulmonary   (+) Exertional shortness of breath      Neuro (within normal limits)      GI (within normal limits)      /Renal   (+) BPH with obstruction/lower urinary tract symptoms      Liver (within normal limits)      Endocrine (within normal limits)      Hematology   (+) Thrombocytopenia (CMS-HCC)      HEENT (within normal limits)       Clinical information reviewed:   Tobacco  Allergies  Meds   Med Hx  Surg Hx   Fam Hx  Soc Hx        NPO Detail:  No data recorded     Physical Exam    Airway  Mallampati: II  TM distance: >3 FB  Neck ROM: full     Cardiovascular - normal exam     Dental - normal exam     Pulmonary - normal exam     Abdominal - normal exam         Anesthesia Plan    History of general anesthesia?: yes  History of complications of general anesthesia?: no    ASA 2     MAC     The patient is not a current smoker.    intravenous induction   Anesthetic plan and risks discussed with patient.    Plan discussed with CRNA and CAA.

## 2025-02-26 NOTE — DISCHARGE INSTRUCTIONS
Dr. Alcocer - [facility]  Phone: [phone]    Follow-Up Information    Following your Prostate Biopsy, please follow up with Dr. Alcocer as scheduled    Medication  Please take the medications as prescribed by your doctor. Tylenol or non-steroids! anti-inflammatory medications (such as Aleve®) should relieve mild pain and discomfort. Resume the usual medications you took before surgery unless instructed otherwise.    Resuming Activities and Driving  *NO Driving on the day of surgery  *You may resume driving the day after surgery  *You may resume normal activities as tolerated  *You may shower     Diet  You may resume your normal diet once at home, with no additional considerations.    Expected Signs and Symptoms  You may have a small amount of bleeding with urination on occasion. This may be accompanied with small blood clots. This is normal and should be relieved by increasing your fluid intake.  You may experience some mild burning and discomfort during urination. This is normal and should subside in one to two weeks.      When to Call Your Doctor - Dr. Alcocer [phone]  Please call the office immediately if any of the following symptoms appear:    *Inability to eat, drink, or take medication  *Persistent Nausea or Vomiting  *Fever over 100.4 degrees F. (38 degrees C.)    Please call 9-1-1 if you experience any of the following:  *Chest Pain, Shortness of Breath or Difficulty Breathing  *Sudden one sided weakness/slurred speech/ any signs or symptoms of a stroke  *Severe headache or visual disturbance    Additional Home-Going Instructions for Adults Who Have Had Anesthesia or Sedatives:    The anesthetics, sedatives and pain killers which were given to you will be acting in your body for the next 24 hours.  This may cause you to feel sleepy.  This feeling will slowly wear off.    For the next 24 hours you SHOULD NOT:  *Drive a car, or operate machinery or power tools  *Drink any form of alcohol  (including beer or wine)  *Make any important Decisions

## 2025-02-26 NOTE — ANESTHESIA POSTPROCEDURE EVALUATION
Patient: Tre Isidro    Procedure Summary       Date: 02/26/25 Room / Location: [facility] [place] [facility] OR 01 / [facility] [place] [facility]    Anesthesia Start: 0822 Anesthesia Stop: 0909    Procedure: Biopsy Prostate with Navigation Diagnosis:       Elevated PSA      (Elevated PSA [R97.20])    Surgeons: Ted Alcocer MD Responsible Provider: Marianela Padilla MD    Anesthesia Type: MAC ASA Status: 2            Anesthesia Type: MAC    Vitals Value Taken Time   /65 02/26/25 0935   Temp 36.2 °C (97.2 °F) 02/26/25 0935   Pulse 75 02/26/25 0935   Resp 16 02/26/25 0935   SpO2 97 % 02/26/25 0935       Anesthesia Post Evaluation    Patient location during evaluation: bedside  Patient participation: complete - patient participated  Level of consciousness: awake  Pain score: 0  Pain management: adequate  Airway patency: patent  Cardiovascular status: acceptable  Respiratory status: acceptable  Hydration status: acceptable  Postoperative Nausea and Vomiting: none    There were no known notable events for this encounter.

## 2025-02-26 NOTE — OP NOTE
Biopsy Prostate with Navigation Operative Note     Date: 2025  [place] [facility] [place] [facility] [place]    Name: Tre Isidro, : 1955, Age: 69 y.o., MRN: [id number], Sex: male    Diagnosis  Pre-op Diagnosis      * Elevated PSA [R97.20] Post-op Diagnosis     * Elevated PSA [R97.20]     Procedures  Biopsy Prostate with Navigation  73519 - G US GUIDANCE NEEDLE PLACEMENT IMG S&I      Surgeons      * Ted Alcocer - Primary    Resident/Fellow/Other Assistant:  Surgeons and Role:     * Rosa Laurent PA-C - NATHANAEL First Assist        Estimated Blood Loss (ml)  5.   Specimen(s) Removed  Removed region of interest and systematic biopsies.   Drain(s)  None.   Implant(s)  None.   Complications  None.   Indications (History)  Elevated PSA.   Findings of Procedure  23g prostate, mony x2 ~25 cores..   Description of Procedure  After administration of anesthesia, a prostate block was performed and transrectal ultrasound. Transperineal biopsies taken from region of interest and systematic template performed using the precision point device.

## 2025-02-26 NOTE — H&P
History Of Present Illness  Tre Isidro is a 69 y.o. male presenting with elevated psa.     Past Medical History  Past Medical History:   Diagnosis Date    Arthritis     BPH (benign prostatic hyperplasia)     Cataract     Hyperlipidemia     Personal history of other diseases of the respiratory system     History of acute pulmonary edema    Personal history of other endocrine, nutritional and metabolic disease     History of hyperlipidemia    Personal history of other infectious and parasitic diseases     History of onychomycosis       Surgical History  Past Surgical History:   Procedure Laterality Date    NO PAST SURGERIES      WISDOM TOOTH EXTRACTION          Social History  He reports that he has never smoked. He has never been exposed to tobacco smoke. He has never used smokeless tobacco. He reports current alcohol use of about 4.0 standard drinks of alcohol per week. He reports that he does not currently use drugs.    Family History  Family History   Problem Relation Name Age of Onset    Arthritis Mother Rose Marie         Mother  at age 80    Diabetes Mother Rose Marie     Stroke Mother Rose Marie     Heart failure Mother Rose Marie     Osteoarthritis Mother Rose Marie     Heart disease Father Rajinder     Heart attack Father Rajinder     Colon cancer Other family HX     Prostate cancer Other family HX         Allergies  Sulfamethoxazole-trimethoprim    Review of Systems     Physical Exam     Last Recorded Vitals  Weight 77.1 kg (169 lb 15.6 oz).    Relevant Results             Assessment/Plan   Assessment & Plan  Elevated PSA      biopsy       I spent  minutes in the professional and overall care of this patient.      Ted Alcocer MD

## 2025-02-26 NOTE — ANESTHESIA POSTPROCEDURE EVALUATION
Patient: Tre Isidro    Procedure Summary       Date: 02/26/25 [place] [facility] [place] [facility] [place] [facility] [place] [place]    Anesthesia Start: 0822 Anesthesia Stop: 0909    Procedure: Biopsy Prostate with Navigation Diagnosis:       Elevated PSA      (Elevated PSA [R97.20])    Surgeons: Ted Alcocer MD Responsible Provider: Marianela Padilla MD    Anesthesia Type: MAC ASA Status: 2            Anesthesia Type: MAC    Vitals Value Taken Time   /65 02/26/25 0935   Temp 36.2 °C (97.2 °F) 02/26/25 0935   Pulse 75 02/26/25 0935   Resp 16 02/26/25 0935   SpO2 97 % 02/26/25 0935       Anesthesia Post Evaluation    Patient location during evaluation: PACU  Patient participation: complete - patient participated  Level of consciousness: awake  Pain score: 0  Pain management: adequate  Airway patency: patent  Cardiovascular status: acceptable  Respiratory status: acceptable  Hydration status: acceptable  Postoperative Nausea and Vomiting: none        There were no known notable events for this encounter.

## 2025-03-06 LAB
LABORATORY COMMENT REPORT: NORMAL
PATH REPORT.FINAL DX SPEC: NORMAL
PATH REPORT.GROSS SPEC: NORMAL
PATH REPORT.RELEVANT HX SPEC: NORMAL
PATH REPORT.TOTAL CANCER: NORMAL

## 2025-03-17 PROBLEM — C61 PROSTATE CANCER (MULTI): Status: ACTIVE | Noted: 2025-03-17

## 2025-03-17 NOTE — PROGRESS NOTES
HPI:  Proc (2/26/25): TP prostate biopsy   Path: prostatic adenocarcinoma, GG1 (Eusebio score 3+3=6), 1/2 cores (90% of tissue)    Tre Isidro is a 69 y.o. male referred by Dr. Baltazar elevated PSA. Hx of BPH w/ LUTS, ED, HLD. MRI Prostate (1/13/25) showed 9.9 ml, PI-RADS 4 lesion within the right PZ of the midgland, PI-RADS 4 lesion within the left PZ of the base of the prostate, no evidence of pelvic lymphadenopathy, diffuse wall thickening of the bladder with focal thickening of the right lateral wall which can be secondary to nondistention. S/p TP prostate biopsy (2/26/25) with pathology showing prostatic adenocarcinoma, GG1 (Euseboi score 3+3=6), 1/2 cores (90% of tissue). S/p biopsy reports penile soreness and dysuria for 3 weeks, now resolved. Doing well.      PSA: 1.6 (2/20/25)    MRI Prostate (1/13/25): 9.9 ml, PI-RADS 4 lesion within the right PZ of the midgland, PI-RADS 4 lesion within the left PZ of the base of the prostate, no evidence of pelvic lymphadenopathy, diffuse wall thickening of the bladder with focal thickening of the right lateral wall which can be secondary to nondistention.    Review of Systems:  All systems reviewed. Anything negative noted in the HPI.    Physical Exam:  Virtual visit.     Procedures:    Assessment/Plan   Tre Isidro is a 69 y.o. male referred by Dr. Baltazar elevated PSA. Hx of BPH w/ LUTS, ED, HLD. MRI Prostate (1/13/25) showed 9.9 ml, PI-RADS 4 lesion within the right PZ of the midgland, PI-RADS 4 lesion within the left PZ of the base of the prostate, no evidence of pelvic lymphadenopathy, diffuse wall thickening of the bladder with focal thickening of the right lateral wall which can be secondary to nondistention. S/p TP prostate biopsy (2/26/25) with pathology showing prostatic adenocarcinoma, GG1 ([place] score 3+3=6), 1/2 cores (90% of tissue). S/p biopsy reports penile soreness and dysuria for 3 weeks, now resolved. Doing well. Management options including risks,  benefits and alternatives discussed at length and all questions answered. Patient prefers to proceed with active surveillance, will follow-up in 6mos with PSA.    Will follow-up with Dr. Baltazar for urinary complaints next available.           Scribe Attestation:  By signing my name below, Kathy FELICIANO Scribe   attest that this documentation has been prepared under the direction and in the presence of Ted Alcocer MD.

## 2025-03-19 ENCOUNTER — TELEMEDICINE (OUTPATIENT)
Dept: UROLOGY | Facility: CLINIC | Age: 70
End: 2025-03-19
Payer: MEDICARE

## 2025-03-19 DIAGNOSIS — C61 PROSTATE CANCER (MULTI): Primary | ICD-10-CM

## 2025-03-19 PROCEDURE — 99214 OFFICE O/P EST MOD 30 MIN: CPT | Performed by: STUDENT IN AN ORGANIZED HEALTH CARE EDUCATION/TRAINING PROGRAM

## 2025-03-19 PROCEDURE — 1123F ACP DISCUSS/DSCN MKR DOCD: CPT | Performed by: STUDENT IN AN ORGANIZED HEALTH CARE EDUCATION/TRAINING PROGRAM

## 2025-03-20 ENCOUNTER — APPOINTMENT (OUTPATIENT)
Dept: UROLOGY | Facility: CLINIC | Age: 70
End: 2025-03-20
Payer: MEDICARE

## 2025-03-31 NOTE — PROGRESS NOTES
Subjective     This visit was completed via telemedicine. All issues as below were discussed and addressed but no physical exam was performed unless allowed by visual confirmation. If it was felt that the patient should be evaluated in clinic, then they were directed there. Patient verbally consented to visit.    Tre Isidro is a 69 y.o. male with history of BPH with LUTs, incomplete bladder emptying and PI-RADS 4 lesion s/p TP prostate biopsy (2/26/25) with pathology showing prostatic adenocarcinoma, GG1 (Eusebio score 3+3=6), 1/2 cores (90% of tissue). Patient presents today for a follow up visit. He is currently under active surveillance. They ask today for further investigation into his urinary symptoms.     Prostate was 9.9cc on MRI from 01/13/2025.         LAST VISIT (8/22/2023):  67 year old male presents today as a new patient for , kindly referred by Dr. Leal. Early July he developed UTI symptoms including burning with urination, dysuria, and an increase in urinary urgency and frequency. His urine culture was negative. He was treated for suspected prostatitis with Amoxicillin and Ciprofloxacin. He notices that his symptoms are worse when he is at work vs working remote. He voids roughly every 2-3 hours during the day. Reports nocturia x1 but does not feel sleep deprived. He does have urinary urgency and had a few episodes of urge incontinence. He will have occasional weak stream at night. He feels empty after voiding. Patient denies gross hematuria, fever, and chills. Patient denies a history of kidney stones. PSA total was 1.3 on 08/02/23. He denies any family history of prostate cancer. Patient is a non smoker. His initial voiding residual was 500 and second .       Past Medical History:   Diagnosis Date    Arthritis     BPH (benign prostatic hyperplasia)     Cataract     Hyperlipidemia     Personal history of other diseases of the respiratory system     History of acute pulmonary edema     Personal history of other endocrine, nutritional and metabolic disease     History of hyperlipidemia    Personal history of other infectious and parasitic diseases     History of onychomycosis     Past Surgical History:   Procedure Laterality Date    NO PAST SURGERIES      WISDOM TOOTH EXTRACTION  1970     Family History   Problem Relation Name Age of Onset    Arthritis Mother Rose Marie         Mother  at age 80    Diabetes Mother Rose Marie     Stroke Mother Rose Marie     Heart failure Mother Rose Marie     Osteoarthritis Mother Rose Marie     Heart disease Father Rajinder     Heart attack Father Rajinder     Colon cancer Other family HX     Prostate cancer Other family HX      Current Outpatient Medications   Medication Sig Dispense Refill    ascorbic acid (Vitamin C) 125 mg chewable tablet Chew 1 tablet (125 mg) once daily.      cholecalciferol (Vitamin D-3) 5,000 Units tablet Take 1 tablet (5,000 Units) by mouth once daily.      coenzyme Q-10 100 mg capsule Take 1 capsule (100 mg) by mouth 2 times a day. (Patient not taking: Reported on 2025)      cyanocobalamin (Vitamin B-12) 1,000 mcg tablet Take 1 tablet (1,000 mcg) by mouth once daily.      multivit-min-FA-lycopen-lutein (Essential Man 50 Plus) 0.4-2-250 mg-mg-mcg tablet Take 1 tablet by mouth once daily. (Patient not taking: Reported on 2025)      multivitamin tablet Take 1 tablet by mouth once daily.      tadalafil 20 mg tablet Take 1 tablet (20 mg) by mouth. (Patient not taking: Reported on 2025)       No current facility-administered medications for this visit.     Allergies   Allergen Reactions    Sulfamethoxazole-Trimethoprim Rash     Social History     Socioeconomic History    Marital status:      Spouse name: Not on file    Number of children: Not on file    Years of education: Not on file    Highest education level: Not on file   Occupational History    Not on file   Tobacco Use    Smoking status: Never     Passive exposure: Never    Smokeless  tobacco: Never    Tobacco comments:     Pt denies any illness in past 30 days     No past surgery for pt with GA, denies family reaction or problems with anesthesia     Denies SOB with stairs/daily activity     Ambulates freely   Vaping Use    Vaping status: Never Used   Substance and Sexual Activity    Alcohol use: Yes     Alcohol/week: 4.0 standard drinks of alcohol     Types: 4 Glasses of wine per week     Comment: every few days    Drug use: Not Currently    Sexual activity: Yes     Partners: Female   Other Topics Concern    Not on file   Social History Narrative    Not on file     Social Drivers of Health     Financial Resource Strain: Not on file   Food Insecurity: Not on file   Transportation Needs: Not on file   Physical Activity: Not on file   Stress: Not on file   Social Connections: Not on file   Intimate Partner Violence: Not on file   Housing Stability: Not on file       Review of Systems  Pertinent items are noted in HPI.    Objective       Lab Review  Lab Results   Component Value Date    WBC 6.4 02/20/2025    RBC 4.69 02/20/2025    HGB 15.2 02/20/2025    HCT 45.3 02/20/2025     02/20/2025      Lab Results   Component Value Date    BUN 11 02/20/2025    CREATININE 0.64 (L) 02/20/2025      Lab Results   Component Value Date    PSA 1.6 02/20/2025         Assessment/Plan   Diagnoses and all orders for this visit:  Need for prophylactic antibiotic        BPH with LUTS - incomplete bladder emptying   Eusebio 6 prostate cancer on AS    The risks of cystoscopy were discussed with the patient in great detail, including risk of hematuria, UTI and discomfort. Antibiotic will be prescribed to be taken 1 hour prior to the procedure. Patient agrees to proceed.     All questions were answered to the patient's satisfaction. Patient agrees with the plan and wishes to proceed. Follow-up will be scheduled appropriately.     E&M visit today is associated with current or anticipated ongoing medical care services  related to a patient's single, serious condition or a complex condition.    Scribed for Dr. Baltazar by Ling Hayes. I , Dr Baltazar, have personally reviewed and agreed with the information entered by the Virtual Scribe.

## 2025-04-01 ENCOUNTER — APPOINTMENT (OUTPATIENT)
Dept: UROLOGY | Facility: CLINIC | Age: 70
End: 2025-04-01
Payer: MEDICARE

## 2025-04-01 DIAGNOSIS — C61 PROSTATE CANCER (MULTI): ICD-10-CM

## 2025-04-01 DIAGNOSIS — N40.1 BPH WITH OBSTRUCTION/LOWER URINARY TRACT SYMPTOMS: Primary | ICD-10-CM

## 2025-04-01 DIAGNOSIS — N13.8 BPH WITH OBSTRUCTION/LOWER URINARY TRACT SYMPTOMS: Primary | ICD-10-CM

## 2025-04-01 DIAGNOSIS — Z79.2 NEED FOR PROPHYLACTIC ANTIBIOTIC: ICD-10-CM

## 2025-04-01 PROCEDURE — G2211 COMPLEX E/M VISIT ADD ON: HCPCS | Performed by: UROLOGY

## 2025-04-01 PROCEDURE — 1123F ACP DISCUSS/DSCN MKR DOCD: CPT | Performed by: UROLOGY

## 2025-04-01 PROCEDURE — 99214 OFFICE O/P EST MOD 30 MIN: CPT | Performed by: UROLOGY

## 2025-04-01 RX ORDER — CEPHALEXIN 500 MG/1
500 CAPSULE ORAL ONCE
Qty: 1 CAPSULE | Refills: 0 | Status: SHIPPED | OUTPATIENT
Start: 2025-04-01 | End: 2025-04-01

## 2025-04-03 NOTE — PROGRESS NOTES
Patient ID: Tre Isidro is a 69 y.o. male.  Referring Physician: No referring provider defined for this encounter.  Primary Care Provider: ALLY Dunham    Tre Isidro is a 69 year old male with PMH of BPH w/ LUTS, HLD, arthitis, prostate CA (biopsy completed 2/26/25) who presents today for an integrative oncology symptom management clinic office visit.       CANCER HISTORY:   No ref. provider found   For:  [Associated problem not found]   Integrative Oncology History:  Elevated PSA, MRI 1/13/25 identified lesion within the right PZ of the midgland  S/p TP prostate biopsy (2/26/25) with pathology showing prostatic adenocarcinoma, GG1 ([place] score 3+3=6), 1/2 cores (90% of tissue).     Pt prefers to proceed with active surveillance, follow up with urology in 6 months with PSA level. Scheduling cystoscopy to assist with BPH and incomplete bladder emptying.    Somatic complaints today:      Nutrition:  Dietary Habits:   Breakfast:   Lunch:   Dinner:   Snack(s):   Soda and sugar:   Eating out:      Food Intake:   Food Intolerances/Allergies:   Dietary restrictions or preferences:    Caffeine:   Water:     Physical Activity:  Current Activity Level:   Barriers to Activity:   Goals:     Restorative Sleep:  Sleep Duration and Quality:  Sleep Environment:   Sleep Hygiene:   Sleep Goals:      Stress Management:  Stress Level:   Coping mechanisms:  Stress Triggers:   Stress Management Techniques:   Stress Goals:      Substance Use:  Tobacco Use:   Alcohol Use:   Other Substance Use:      Social Connections:  Social Support:   Social Activities:   Social Goals:      Goals and Expectations:  Patient's Goals:      Patient's Expectations: (What does the patient expect from the visit?)       Supplements:   CoQ10 100 mg daily  Men's multivitamin   Vitamin B12 1000 mcg daily  Vitamin D3 5000 units daily     ROS:  no ha, visual symptoms, hearing loss  no sob, chest pain, palp  ROS o/w non contributory, please see  HPI    Objective    BSA: There is no height or weight on file to calculate BSA.  There were no vitals taken for this visit.    PHYSICAL EXAM:  NAD, awake/alert  HEENT, NCAT, OP clear, no oral lesions  CTA bilat  RRR no mgr  Abd soft/nt/nd+bs  No c/c/e/ttp  Motor/sensory intact, CN 2-12 intact     RESULTS:  Lab Results   Component Value Date    WBC 6.4 02/20/2025    HGB 15.2 02/20/2025    HCT 45.3 02/20/2025     02/20/2025    CREATININE 0.64 (L) 02/20/2025    AST 24 02/20/2025       Integrative Labs:    Assessment/Plan   Cancer Staging   No matching staging information was found for the patient.      CANCER SPECIFIC Suggestions:      SYMPTOMS Experienced:      Recommendations:    The role of diet in prostate health is significant. An integrative approach emphasizes a nutrient-rich diet, high in fruits and vegetables, which are sources of essential vitamins, minerals, and antioxidants. These components can help support a healthy inflammatory response and manage oxidative stress, both of which are implicated in prostate conditions like BPH and prostate cancer. Foods rich in lycopene, such as tomatoes, and omega-3 fatty acids, found in fatty fish like salmon, are particularly beneficial. Conversely, a diet high in red meat, dairy products, and excessive sugars may contribute to the risk of developing prostate issues and are generally advised to be consumed in moderation.    https://www.ifm.org/articles/prostate-health-and-cancer-risk     Supplements:  - Saw palmetto -- check for interactions  - Zinc  - Selenium         LIFESTYLE:  Diet - 5-9 fruits/veg/day (7 veg to 2 fruits)  Cruciferous Vegetables - Brussel Sprouts, Kale, Broccoli, Cauliflower  Plant based anti-inflammatory whole foods diet  AICR New American Plate  PHYSICAL ACTIVITY 30 MIN/DAY    Integrative Therapies:  Acupuncture ($42/session group acupuncture rate)  Massage (covered by insurance)  REIKI (Free)    Natural Products to Consider:    For Further  Information:  Reading: Anticancer Living, Cancer Fighting Kitchen  Websites: Cancerchoices.org  Aminah: ONCIO  Podcast: Integrative Oncology Talk    Follow up: Integrative Oncology 3 months    I spent 60 minutes in direct consultation with this patient with over 50% of time spent directly with patient.  Thank you for consulting me in for this patient  GUNNAR Weaver-CNP        square for equal breaths in and out  Mindful breathing: Focus on your breath without changing the way you breath. Next, inhale and exhale through the nose, with the mouth closed  Nostril breathing: Breathe through one nostril at a time. Manually close right nostril and inhale through left. Close left, exhale, then inhale through right. Close right, exhale then inhale through left.       Acupuncture  Offered in group or individual sessions  For more information -- Acupuncture at [facility]       Sleep Hygiene    General Recommendations:  Follow a consistent bedtime routine each night to prepare your mind and body for rest  The bedroom should be like a cave - dark, cool (68-70 degrees F), quiet and comfortable   Limit eating and drinking for 3 hours before bed  Move your body during the day, but ensure strenuous activity is at least 2-3 hours before bed  Wind down before bed with relaxation practices (bath, read, meditate, journal, breathe)  Reduce blue light exposure at least 1-2 hours prior to sleep  Avoid alcohol and high simple carb foods prior to sleep    Reducing blue light exposure  Add red light to your phone   Aminah: One Sec -- reduce screen time    Meditation recommendations  Apps: Calm, Pushkart Timer, Headspace    Sleep trackers  Oura Ring  Ultra human Ring Air  Apple Watch  Fitbit Watch  Garmin Watch  [employer] Watch  Whoop Wristband      Physical Activity    General Recommendations:  Aerobic Activity: Get at least 150 minutes/week of moderate-intensity or 75 minutes/week of vigorous aerobic activity, or a combination across the week.  Strength Training: Add moderate- to high-intensity muscle-strengthening activity (such as resistance or weights) at least 2 day/week.  Sit Less: Spend less time sitting. Even light-intensity activity can offset some of the risks of being sedentary.  Move More: Gain even more benefits by being active at least 300 minutes (5 hours) per week.  Start Slow and Grow:  Increase amount and intensity gradually over time.    Bringing More Movement to Your Day:   Park farther away, take the stairs, stand up once per hour to walk around or march in place   Keep resistance bands or free weights visible (i.e. on your coffee table), so you can use these while you are idle or watching TV   Move your muscles for 5-10 minutes after eating   Go for a short walk, climb stairs, clean up around the house, dance     At home workout ideas:  Resistance bands - program through [employer] and [employer]  Online Home Workout Channels:   [id number] - seated and walking exercises   Senior Fitness with Sherron - beginner free weight exercises   Yoga with Ayana - chair yoga, 30-day introduction to yoga series   Yoga with Reilly Oates - Lilly Yoga and Yoga   Nidra (for relaxation before bed)   DeskFit - exercises that can be utilized at work or at home    Local Personal Training Resources:   [employer]  [facility] in [facility] in [place]    Other Physical Activity Resources through :   [facility]  Fitter Me Program   PREP Program - referral from provider   Community Wellness Centers: [facility], [place]      Integrative Therapies:  Acupuncture ($42/session group acupuncture rate)  Massage (covered by insurance)  REIKI (Free)    For Further Information:  Reading: Anticancer Living, Cancer Fighting Kitchen  Websites: Cancerchoices.org  Aminah: ONCIO  Podcast: Integrative Oncology Talk    Follow up: Integrative Oncology 6 months    I spent 60 minutes in direct consultation with this patient with over 50% of time spent directly with patient.  Thank you for consulting me in for this patient  GUNNAR Weaver-CNP

## 2025-04-09 ENCOUNTER — APPOINTMENT (OUTPATIENT)
Dept: INTEGRATIVE MEDICINE | Facility: CLINIC | Age: 70
End: 2025-04-09
Payer: MEDICARE

## 2025-04-09 DIAGNOSIS — F43.9 STRESS: Primary | ICD-10-CM

## 2025-04-09 DIAGNOSIS — R53.83 FATIGUE, UNSPECIFIED TYPE: ICD-10-CM

## 2025-04-09 PROCEDURE — 99205 OFFICE O/P NEW HI 60 MIN: CPT

## 2025-04-09 NOTE — PATIENT INSTRUCTIONS
The role of diet in prostate health is significant. An integrative approach emphasizes a nutrient-rich diet, high in fruits and vegetables, which are sources of essential vitamins, minerals, and antioxidants. These components can help support a healthy inflammatory response and manage oxidative stress, both of which are implicated in prostate conditions like BPH and prostate cancer. Foods rich in lycopene, such as tomatoes, and omega-3 fatty acids, are particularly beneficial.     May consider stopping glucosamine supplement, as there's been some research showing that it may increase prostate cancer risk. Article linked here: https://pmc.ncbi.nlm.nih.gov/articles/QBZ87218990/      Stress management:     Common Mindfulness Practices:  Mindful eating  Mindful breathing  Mindful movement - MINDBODY rusty to see [facility] offerings  Yoga  Win Chi  Qi Gong  Expressive therapies  HEALTH tracks  Music and/or art therapy group sessions (handout provided)  Music therapy consult   Intentional time in nature  Gratitude  Journaling  Meditation  Click here for [facility] Free Guided Meditations  Apps: Calm, Insight Timer   Body scan  Mantra meditation  Guided Imagery      Breathing Techniques:  4.7.8 Breath- inhale through the nose for 4 seconds, hold for 7 seconds, exhale through pursed lips for 8; repeat 4 breath cycles, 3-5x daily   Square breathing: Visualize all 4 sides of a square for equal breaths in and out  Mindful breathing: Focus on your breath without changing the way you breath. Next, inhale and exhale through the nose, with the mouth closed  Nostril breathing: Breathe through one nostril at a time. Manually close right nostril and inhale through left. Close left, exhale, then inhale through right. Close right, exhale then inhale through left.       Acupuncture  Offered in group or individual sessions  For more information -- Acupuncture at [facility]       Sleep Hygiene    General  Recommendations:  Follow a consistent bedtime routine each night to prepare your mind and body for rest  The bedroom should be like a cave - dark, cool (68-70 degrees F), quiet and comfortable   Limit eating and drinking for 3 hours before bed  Move your body during the day, but ensure strenuous activity is at least 2-3 hours before bed  Wind down before bed with relaxation practices (bath, read, meditate, journal, breathe)  Reduce blue light exposure at least 1-2 hours prior to sleep  Avoid alcohol and high simple carb foods prior to sleep    Reducing blue light exposure  Add red light to your phone   Aminah: One Sec -- reduce screen time    Meditation recommendations  Apps: Calm, Smartdate Timer, Headspace    Sleep trackers  Oura Ring  Ultra human Ring Air  Apple Watch  Fitbit Watch  Garmin Watch  CommuniClique Watch  Whoop Wristband      Physical Activity    General Recommendations:  Aerobic Activity: Get at least 150 minutes/week of moderate-intensity or 75 minutes/week of vigorous aerobic activity, or a combination across the week.  Strength Training: Add moderate- to high-intensity muscle-strengthening activity (such as resistance or weights) at least 2 day/week.  Sit Less: Spend less time sitting. Even light-intensity activity can offset some of the risks of being sedentary.  Move More: Gain even more benefits by being active at least 300 minutes (5 hours) per week.  Start Slow and Grow: Increase amount and intensity gradually over time.    Bringing More Movement to Your Day:   Park farther away, take the stairs, stand up once per hour to walk around or march in place   Keep resistance bands or free weights visible (i.e. on your coffee table), so you can use these while you are idle or watching TV   Move your muscles for 5-10 minutes after eating   Go for a short walk, climb stairs, clean up around the house, dance     At home workout ideas:  Resistance bands - program through [employer] and  [employer]  Online Home Workout Channels:   [id number] - seated and walking exercises   Senior Fitness with Sherron - beginner free weight exercises   Yoga with Ayana - chair yoga, 30-day introduction to yoga series   Yoga with Reilly Oates - Yin Yoga and Yoga   Nidra (for relaxation before bed)   DeskFit - exercises that can be utilized at work or at home    Local Personal Training Resources:   [employer]   [facility] in [place]  [facility] in [place]    Other Physical Activity Resources through :   [facility]  Fitter Me Program   PREP Program - referral from provider  [facility]: [facility], [place]      Integrative Therapies:  Acupuncture ($42/session group acupuncture rate)  Massage (covered by insurance)  REIKI (Free)    For Further Information:  Reading: An[employer]ancer Living, Cancer Fighting Kitchen  Websites: [email or web address]  Aminah: ONCIO  Podcast: Integrative Oncology Talk    Follow up: Integrative Oncology 6 months

## 2025-04-29 ENCOUNTER — APPOINTMENT (OUTPATIENT)
Dept: UROLOGY | Facility: CLINIC | Age: 70
End: 2025-04-29
Payer: MEDICARE

## 2025-04-29 VITALS
BODY MASS INDEX: 23.33 KG/M2 | WEIGHT: 176 LBS | HEART RATE: 84 BPM | DIASTOLIC BLOOD PRESSURE: 85 MMHG | HEIGHT: 73 IN | TEMPERATURE: 97.8 F | SYSTOLIC BLOOD PRESSURE: 135 MMHG

## 2025-04-29 DIAGNOSIS — N13.8 BPH WITH OBSTRUCTION/LOWER URINARY TRACT SYMPTOMS: Primary | ICD-10-CM

## 2025-04-29 DIAGNOSIS — N40.1 BPH WITH OBSTRUCTION/LOWER URINARY TRACT SYMPTOMS: Primary | ICD-10-CM

## 2025-04-29 LAB
POC APPEARANCE, URINE: CLEAR
POC BILIRUBIN, URINE: NEGATIVE
POC BLOOD, URINE: ABNORMAL
POC COLOR, URINE: YELLOW
POC GLUCOSE, URINE: NEGATIVE MG/DL
POC KETONES, URINE: NEGATIVE MG/DL
POC LEUKOCYTES, URINE: ABNORMAL
POC NITRITE,URINE: NEGATIVE
POC PH, URINE: 5.5 PH
POC PROTEIN, URINE: NEGATIVE MG/DL
POC SPECIFIC GRAVITY, URINE: <=1.005
POC UROBILINOGEN, URINE: 0.2 EU/DL

## 2025-04-29 PROCEDURE — 52000 CYSTOURETHROSCOPY: CPT | Performed by: UROLOGY

## 2025-04-29 PROCEDURE — 81003 URINALYSIS AUTO W/O SCOPE: CPT | Performed by: UROLOGY

## 2025-04-29 PROCEDURE — 99215 OFFICE O/P EST HI 40 MIN: CPT | Performed by: UROLOGY

## 2025-04-29 RX ORDER — CEFAZOLIN SODIUM 2 G/100ML
2 INJECTION, SOLUTION INTRAVENOUS ONCE
OUTPATIENT
Start: 2025-04-29 | End: 2025-04-29

## 2025-04-29 ASSESSMENT — PAIN SCALES - GENERAL: PAINLEVEL_OUTOF10: 0-NO PAIN

## 2025-04-29 NOTE — PROGRESS NOTES
Subjective       Tre Isidro is a 69 y.o. male with history of BPH with LUTs, incomplete bladder emptying and PI-RADS 4 lesion s/p TP prostate biopsy (2/26/25) with pathology showing prostatic adenocarcinoma, GG1 (Hamlin score 3+3=6), 1/2 cores (90% of tissue) currently under active surveillance. Patient presents today for cystoscopy.    Prostate was 9.9cc on MRI from 01/13/2025.         INITIAL VISIT (8/22/2023):  67 year old male presents today as a new patient for , kindly referred by Dr. Leal. Early July he developed UTI symptoms including burning with urination, dysuria, and an increase in urinary urgency and frequency. His urine culture was negative. He was treated for suspected prostatitis with Amoxicillin and Ciprofloxacin. He notices that his symptoms are worse when he is at work vs working remote. He voids roughly every 2-3 hours during the day. Reports nocturia x1 but does not feel sleep deprived. He does have urinary urgency and had a few episodes of urge incontinence. He will have occasional weak stream at night. He feels empty after voiding. Patient denies gross hematuria, fever, and chills. Patient denies a history of kidney stones. PSA total was 1.3 on 08/02/23. He denies any family history of prostate cancer. Patient is a non smoker. His initial voiding residual was 500 and second .       Past Medical History:   Diagnosis Date    Arthritis     BPH (benign prostatic hyperplasia)     Cataract     Hyperlipidemia     Personal history of other diseases of the respiratory system     History of acute pulmonary edema    Personal history of other endocrine, nutritional and metabolic disease     History of hyperlipidemia    Personal history of other infectious and parasitic diseases     History of onychomycosis     Past Surgical History:   Procedure Laterality Date    NO PAST SURGERIES      WISDOM TOOTH EXTRACTION  1970     Family History   Problem Relation Name Age of Onset    Arthritis  Mother Rose Marie         Mother  at age 80    Diabetes Mother Rose Marie     Stroke Mother Rose Marie     Heart failure Mother Rose Marie     Osteoarthritis Mother Rose Marie     Heart disease Father Rajinder     Heart attack Father Rajinder     Colon cancer Other family HX     Prostate cancer Other family HX      Current Outpatient Medications   Medication Sig Dispense Refill    cholecalciferol (Vitamin D-3) 5,000 Units tablet Take 1 tablet (125 mcg) by mouth once daily.      ascorbic acid (Vitamin C) 125 mg chewable tablet Chew 1 tablet (125 mg) once daily.      coenzyme Q-10 100 mg capsule Take 1 capsule (100 mg) by mouth 2 times a day. (Patient not taking: Reported on 2025)      cyanocobalamin (Vitamin B-12) 1,000 mcg tablet Take 1 tablet (1,000 mcg) by mouth once daily.      multivit-min-FA-lycopen-lutein (Essential Man 50 Plus) 0.4-2-250 mg-mg-mcg tablet Take 1 tablet by mouth once daily. (Patient not taking: Reported on 2025)      multivitamin tablet Take 1 tablet by mouth once daily.      tadalafil 20 mg tablet Take 1 tablet (20 mg) by mouth. (Patient not taking: Reported on 2025)       No current facility-administered medications for this visit.     Allergies   Allergen Reactions    Sulfamethoxazole-Trimethoprim Rash     Social History     Socioeconomic History    Marital status:      Spouse name: Not on file    Number of children: Not on file    Years of education: Not on file    Highest education level: Not on file   Occupational History    Not on file   Tobacco Use    Smoking status: Never     Passive exposure: Never    Smokeless tobacco: Never    Tobacco comments:     Pt denies any illness in past 30 days     No past surgery for pt with GA, denies family reaction or problems with anesthesia     Denies SOB with stairs/daily activity     Ambulates freely   Vaping Use    Vaping status: Never Used   Substance and Sexual Activity    Alcohol use: Yes     Alcohol/week: 4.0 standard drinks of alcohol      Types: 4 Glasses of wine per week     Comment: every few days    Drug use: Not Currently    Sexual activity: Yes     Partners: Female   Other Topics Concern    Not on file   Social History Narrative    Not on file     Social Drivers of Health     Financial Resource Strain: Not on file   Food Insecurity: Not on file   Transportation Needs: Not on file   Physical Activity: Not on file   Stress: Not on file   Social Connections: Not on file   Intimate Partner Violence: Not on file   Housing Stability: Not on file       Review of Systems  Pertinent items are noted in HPI.    Objective   Cystoscopy performed:   Tre Isidro identified using two (2) forms of identification.  Procedure: diagnostic cystourethroscopy  Indications for procedure: BPH with LUTS  Risks, benefits, and alternatives were discussed in detail.   Patient appears to understand and agrees to proceed.   Patient has signed the procedure consent form.     Cystoscopy findings:  Urethra: normal course and caliber, no evidence of stricture or lesion.  Prostate: non-obstructing median lobe.   Bladder: trabeculated, distended, no stone, tumors or other lesions.  Post-cystoscopy: Patient tolerated procedure without complications.    [x] Lateral hypertrophy, with partial channel occlusion.  [] Obstructing median lobe with intravesical protrusion.  [x] Good sphincter coaptation.  [] Normal bladder.         Lab Review  Lab Results   Component Value Date    WBC 6.4 02/20/2025    RBC 4.69 02/20/2025    HGB 15.2 02/20/2025    HCT 45.3 02/20/2025     02/20/2025      Lab Results   Component Value Date    BUN 11 02/20/2025    CREATININE 0.64 (L) 02/20/2025      Lab Results   Component Value Date    PSA 1.6 02/20/2025         Assessment/Plan   Diagnoses and all orders for this visit:  BPH with obstruction/lower urinary tract symptoms  -     POCT UA Automated manually resulted  -     Case Request Operating Room: Ablation Transurethral Prostate; Standing  Other  orders  -     Place in outpatient/hospital ambulatory surgery; Standing  -     NPO Diet Except: Sips with meds; Effective now; Standing  -     Height and weight; Standing  -     Insert and maintain peripheral IV; Standing  -     Saline lock IV; Standing  -     ceFAZolin (Ancef) 2 g in dextrose (iso)  mL          BPH with LUTS - incomplete bladder emptying   [place] 6 prostate cancer on AS    Prostate was 9.9cc on MRI from 01/13/2025.     We will proceed with GreenLight laser PVP. I discussed in detail the risks associated with the GreenLight laser PVP procedure, which include small risk of urinary incontinence, risk of erectile dysfunction, 60% risk of retrograde ejaculation, and additional risk of hematuria, UTI, and failure to improve urinary symptoms.       All questions were answered to the patient's satisfaction. Patient agrees with the plan and wishes to proceed. Follow-up will be scheduled appropriately.     E&M visit today is associated with current or anticipated ongoing medical care services related to a patient's single, serious condition or a complex condition.    I spent 40 minutes of dedicated E&M time, including preparation and review of records, notes, and data, time spent with patient/family, and documentation.     Scribed for Dr. Baltazar by Ling Hayes. I , Dr Baltazar, have personally reviewed and agreed with the information entered by the Virtual Scribe.

## 2025-05-01 DIAGNOSIS — Z01.818 PREOP TESTING: ICD-10-CM

## 2025-05-01 DIAGNOSIS — N33 BLADDER DISORDERS IN DISEASES CLASSIFIED ELSEWHERE: ICD-10-CM

## 2025-05-01 DIAGNOSIS — N13.8 BPH WITH OBSTRUCTION/LOWER URINARY TRACT SYMPTOMS: ICD-10-CM

## 2025-05-01 DIAGNOSIS — N40.1 BPH WITH OBSTRUCTION/LOWER URINARY TRACT SYMPTOMS: ICD-10-CM

## 2025-05-30 ENCOUNTER — CLINICAL SUPPORT (OUTPATIENT)
Dept: PREADMISSION TESTING | Facility: HOSPITAL | Age: 70
End: 2025-05-30
Payer: MEDICARE

## 2025-05-30 VITALS — BODY MASS INDEX: 22.53 KG/M2 | WEIGHT: 170 LBS | HEIGHT: 73 IN

## 2025-05-30 RX ORDER — GINSENG 100 MG
CAPSULE ORAL DAILY
COMMUNITY

## 2025-05-30 RX ORDER — VITAMIN B COMPLEX
1 CAPSULE ORAL DAILY
COMMUNITY

## 2025-05-30 RX ORDER — ASPIRIN 81 MG/1
81 TABLET ORAL DAILY
COMMUNITY

## 2025-05-30 ASSESSMENT — ENCOUNTER SYMPTOMS
EYE DISCHARGE: 0
RHINORRHEA: 0
DYSPNEA AT REST: 0
CONFUSION: 0
VOMITING: 0
ABDOMINAL PAIN: 0
WOUND: 1
WHEEZING: 0
MYALGIAS: 1
WEAKNESS: 0
DIFFICULTY URINATING: 1
SHORTNESS OF BREATH: 0
COUGH: 0
FEVER: 0

## 2025-05-30 NOTE — CPM/PAT H&P
[facility]/PAT Evaluation       Name: Tre Isidro (Tre Isidro)  /Age: 1955/69 y.o.     {[facility] Visit Type:59469}      Chief Complaint: ***    HPI    Medical History[1]    Surgical History[2]    Patient  reports being sexually active and has had partner(s) who are female.    Family History[3]    Allergies[4]    Prior to Admission medications    Medication Sig Start Date End Date Taking? Authorizing Provider   ascorbic acid (Vitamin C) 125 mg chewable tablet Chew 1 tablet (125 mg) once daily. 22  Yes Historical Provider, MD   aspirin 81 mg EC tablet Take 1 tablet (81 mg) by mouth once daily.   Yes Historical Provider, MD   b complex vitamins capsule Take 1 capsule by mouth once daily.   Yes Historical Provider, MD   cholecalciferol (Vitamin D-3) 5,000 Units tablet Take 1 tablet (125 mcg) by mouth once daily. 18  Yes Historical Provider, MD   cranberry extract 200 mg capsule Take by mouth once daily.   Yes Historical Provider, MD   multivit-min-FA-lycopen-lutein (Essential Man 50 Plus) 0.4-2-250 mg-mg-mcg tablet Take 1 tablet by mouth once daily. 18  Yes Historical Provider, MD   NON FORMULARY once daily. Balance of nature   Yes Historical Provider, MD   NON FORMULARY 2 times a day. cholesterax   Yes Historical Provider, MD   NON FORMULARY once daily as needed (pain). Zyslamend for joint pain    Historical Provider, MD   tadalafil 20 mg tablet Take 1 tablet (20 mg) by mouth.  Patient not taking: Reported on 2025   Historical Provider, MD   coenzyme Q-10 100 mg capsule Take 1 capsule (100 mg) by mouth 2 times a day.  Patient not taking: Reported on 2025  Historical Provider, MD   cyanocobalamin (Vitamin B-12) 1,000 mcg tablet Take 1 tablet (1,000 mcg) by mouth once daily. 18  Historical Provider, MD   multivitamin tablet Take 1 tablet by mouth once daily.  25  Historical Provider, MD MCGOWAN ROS:   Constitutional:    no fever  Neuro/Psych:    no  "weakness   no confusion  Eyes:    no discharge  Ears:    no hearing loss   no hearing aides  Nose:    no nasal discharge  Mouth:    dental issues  Throat:    no throat pain  Neck:   Cardio:    no chest pain   no dyspnea  Respiratory:    no cough   no wheezing   no shortness of breath  Endocrine:   GI:    no abdominal pain   no vomiting  :    difficulty urinating   polyuria  Musculoskeletal:    myalgias  Hematologic:    no history of blood transfusion   no blood clots  Skin:   New Bug bite   sores/wound   no rash    PAT Physical Exam     Airway    Testing/Diagnostic:     Patient Specialist/PCP:     Visit Vitals  Ht 1.854 m (6' 1\") Comment: stated   Wt 77.1 kg (170 lb) Comment: stated   BMI 22.43 kg/m²   Smoking Status Never   BSA 1.99 m²       DASI Risk Score    No data to display       Caprini DVT Assessment    No data to display       Modified Frailty Index    No data to display       [id number] Stroke Risk Points  Current as of just now        N/A 0 to 9 Points:      Last Change: N/A          The [id number] risk score (Lip JOSE E, et al. 2009. © 2010 American College of Chest Physicians) quantifies the risk of stroke for a patient with atrial fibrillation. For patients without atrial fibrillation or under the age of 18 this score appears as N/A. Higher score values generally indicate higher risk of stroke.        This score is not applicable to this patient. Components are not calculated.          Revised Cardiac Risk Index    No data to display       Apfel Simplified Score    No data to display       Risk Analysis Index Results This Encounter    No data found in the last 10 encounters.       Stop Bang Score      Flowsheet Row Clinical Support from 5/30/2025 in [facility]   Do you snore loudly? 1 filed at 05/30/2025 1303   Do you often feel tired or fatigued after your sleep? 0 filed at 05/30/2025 1303   Has anyone ever observed you stop breathing in your sleep? 0 filed at 05/30/2025 1303   Do " you have or are you being treated for high blood pressure? 0 filed at 2025 1303   Recent BMI (Calculated) 22.4 filed at 2025 1303   Is BMI greater than 35 kg/m2? 0=No filed at 2025 1303   Age older than 50 years old? 1=Yes filed at 2025 1303   Gender - Male 1=Yes filed at 2025 1303          Prodigy: High Risk  Total Score: 16              Prodigy Age Score      Prodigy Gender Score          ARISCAT Score for Postoperative Pulmonary Complications    No data to display       Robbins Perioperative Risk for Myocardial Infarction or Cardiac Arrest (FRANKLYN)    No data to display         Assessment and Plan:     {[facility] ASSESSMENT AND PLAN:77103}               [1]  Past Medical History:  Diagnosis Date   • Arthritis    • BPH (benign prostatic hyperplasia)    • Cataract    • Dental disease    • Hyperlipidemia    • Personal history of other diseases of the respiratory system     History of acute pulmonary edema   • Personal history of other endocrine, nutritional and metabolic disease     History of hyperlipidemia   • Personal history of other infectious and parasitic diseases     History of onychomycosis   [2]  Past Surgical History:  Procedure Laterality Date   • PROSTATE SURGERY      prostate biopsy. 2025   • WISDOM TOOTH EXTRACTION  1970   [3]  Family History  Problem Relation Name Age of Onset   • Arthritis Mother Rose Marie         Mother  at age 80   • Diabetes Mother Rose Marie    • Stroke Mother Rose Marie    • Heart failure Mother Rose Marie    • Osteoarthritis Mother Rose Marie    • Heart disease Father Rajinder    • Heart attack Father Rajinder    • Prostate cancer Other family HX    [4]  Allergies  Allergen Reactions   • Sulfamethoxazole-Trimethoprim Rash

## 2025-05-30 NOTE — PERIOPERATIVE NURSING NOTE
Spoke with patient and completed RN PAT screening call. Discussed PAT education. Chart updated per information provided by patient. Patient had no further questions at this time. In person PAT appointment is schedule for patient. Patient aware.

## 2025-05-31 ENCOUNTER — HOSPITAL ENCOUNTER (EMERGENCY)
Facility: HOSPITAL | Age: 70
Discharge: HOME | End: 2025-05-31
Payer: MEDICARE

## 2025-05-31 VITALS
TEMPERATURE: 100.2 F | RESPIRATION RATE: 16 BRPM | WEIGHT: 170 LBS | HEART RATE: 84 BPM | OXYGEN SATURATION: 99 % | SYSTOLIC BLOOD PRESSURE: 147 MMHG | HEIGHT: 73 IN | DIASTOLIC BLOOD PRESSURE: 82 MMHG | BODY MASS INDEX: 22.53 KG/M2

## 2025-05-31 DIAGNOSIS — L03.221 CELLULITIS OF NECK: ICD-10-CM
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
## 2025-05-31 DIAGNOSIS — S10.16XA INSECT BITE OF THROAT, INITIAL ENCOUNTER: Primary | ICD-10-CM

## 2025-05-31 PROCEDURE — 2500000001 HC RX 250 WO HCPCS SELF ADMINISTERED DRUGS (ALT 637 FOR MEDICARE OP): Performed by: NURSE PRACTITIONER

## 2025-05-31 PROCEDURE — 99283 EMERGENCY DEPT VISIT LOW MDM: CPT

## 2025-05-31 RX ORDER — DOXYCYCLINE HYCLATE 100 MG
100 TABLET ORAL ONCE
Status: DISCONTINUED | OUTPATIENT
Start: 2025-05-31 | End: 2025-05-31

## 2025-05-31 RX ORDER — IBUPROFEN 600 MG/1
600 TABLET, FILM COATED ORAL ONCE
Status: COMPLETED | OUTPATIENT
Start: 2025-05-31 | End: 2025-05-31

## 2025-05-31 RX ORDER — DOXYCYCLINE HYCLATE 100 MG
200 TABLET ORAL ONCE
Status: COMPLETED | OUTPATIENT
Start: 2025-05-31 | End: 2025-05-31

## 2025-05-31 RX ORDER — DOXYCYCLINE 100 MG/1
100 CAPSULE ORAL 2 TIMES DAILY
Qty: 20 CAPSULE | Refills: 0 | Status: SHIPPED | OUTPATIENT
Start: 2025-05-31 | End: 2025-06-10

## 2025-05-31 RX ADMIN — IBUPROFEN 600 MG: 600 TABLET ORAL at 13:56

## 2025-05-31 RX ADMIN — DOXYCYCLINE HYCLATE 100 MG: 100 TABLET, COATED ORAL at 13:59

## 2025-05-31 RX ADMIN — DOXYCYCLINE HYCLATE 100 MG: 100 TABLET, COATED ORAL at 13:56

## 2025-05-31 ASSESSMENT — PAIN DESCRIPTION - LOCATION: LOCATION: NECK

## 2025-05-31 ASSESSMENT — LIFESTYLE VARIABLES
EVER FELT BAD OR GUILTY ABOUT YOUR DRINKING: NO
TOTAL SCORE: 0
HAVE YOU EVER FELT YOU SHOULD CUT DOWN ON YOUR DRINKING: NO
EVER HAD A DRINK FIRST THING IN THE MORNING TO STEADY YOUR NERVES TO GET RID OF A HANGOVER: NO
HAVE PEOPLE ANNOYED YOU BY CRITICIZING YOUR DRINKING: NO

## 2025-05-31 ASSESSMENT — PAIN DESCRIPTION - PAIN TYPE: TYPE: ACUTE PAIN

## 2025-05-31 ASSESSMENT — PAIN SCALES - GENERAL
PAINLEVEL_OUTOF10: 3
PAINLEVEL_OUTOF10: 0 - NO PAIN
PAINLEVEL_OUTOF10: 0 - NO PAIN

## 2025-05-31 ASSESSMENT — PAIN DESCRIPTION - DESCRIPTORS: DESCRIPTORS: BURNING

## 2025-05-31 ASSESSMENT — COLUMBIA-SUICIDE SEVERITY RATING SCALE - C-SSRS
6. HAVE YOU EVER DONE ANYTHING, STARTED TO DO ANYTHING, OR PREPARED TO DO ANYTHING TO END YOUR LIFE?: NO
1. IN THE PAST MONTH, HAVE YOU WISHED YOU WERE DEAD OR WISHED YOU COULD GO TO SLEEP AND NOT WAKE UP?: NO
2. HAVE YOU ACTUALLY HAD ANY THOUGHTS OF KILLING YOURSELF?: NO

## 2025-05-31 ASSESSMENT — PAIN DESCRIPTION - ORIENTATION: ORIENTATION: RIGHT

## 2025-05-31 ASSESSMENT — PAIN - FUNCTIONAL ASSESSMENT
PAIN_FUNCTIONAL_ASSESSMENT: 0-10
PAIN_FUNCTIONAL_ASSESSMENT: 0-10

## 2025-05-31 NOTE — ED TRIAGE NOTES
Pt presented to the ED with c/o insect bite. Pt was in his garden on Monday when afterwards she noticed a bullseye marking on his right collar bone but no tick was ever found. Since then the rash has increased, warm to touch, chills and a fever.,

## 2025-05-31 NOTE — ED PROVIDER NOTES
Emergency Department Encounter  [facility] EMERGENCY MEDICINE    Patient: Tre Isidro  MRN: [id number]  : 1955  Date of Evaluation: 2025  ED Provider: ALLY Pearce      Chief Complaint       Chief Complaint   Patient presents with    Insect Bite     [place]    (Location/Symptom, Timing/Onset, Context/Setting, Quality, Duration, Modifying Factors, Severity) Note limiting factors.   Limitations to History: none  Historian: self  Records reviewed: EMR inpatient and outpatient notes, Care Everywhere      Tre Isidro is a 69 y.o. male who presents to the emergency department complaining of insect bite to the right neck, [place] felt something bite, had itching and burning, initially started to notice a bull's-eye rash, that is now faded and has a large area of erythema to the right neck, shoulder, also with bodyaches, flulike symptoms, fevers for the last few days.  Patient is concerned about Lyme disease.    ROS:     Review of Systems  14 systems reviewed and otherwise acutely negative except as in the [place].          Past History   Medical History[1]  Surgical History[2]  Social History[3]    Medications/Allergies     Previous Medications    ASCORBIC ACID (VITAMIN C) 125 MG CHEWABLE TABLET    Chew 1 tablet (125 mg) once daily.    ASPIRIN 81 MG EC TABLET    Take 1 tablet (81 mg) by mouth once daily.    B COMPLEX VITAMINS CAPSULE    Take 1 capsule by mouth once daily.    CHOLECALCIFEROL (VITAMIN D-3) 5,000 UNITS TABLET    Take 1 tablet (125 mcg) by mouth once daily.    CRANBERRY EXTRACT 200 MG CAPSULE    Take by mouth once daily.    MULTIVIT-MIN-FA-LYCOPEN-LUTEIN (ESSENTIAL MAN 50 PLUS) 0.4-2-250 MG-MG-MCG TABLET    Take 1 tablet by mouth once daily.    NON FORMULARY    once daily. Balance of nature    NON FORMULARY    2 times a day. cholesterax    NON FORMULARY    once daily as needed (pain). Zyslamend for joint pain    TADALAFIL 20 MG  "TABLET    Take 1 tablet (20 mg) by mouth.     Allergies[4]     Physical Exam       ED Triage Vitals [05/31/25 1312]   Temperature Heart Rate Respirations BP   37.9 °C (100.2 °F) 87 18 165/83      Pulse Ox Temp src Heart Rate Source Patient Position   98 % -- Monitor Sitting      BP Location FiO2 (%)     -- --         Physical Exam    GENERAL:  The patient appears nourished and normally developed. Vital signs as documented.     HEENT:  Head normocephalic, atraumatic, EOMs intact, PERRLA, Mucous membranes moist. Nares patent without copious rhinorrhea.  No lymphadenopathy.    PULMONARY:  Lungs are clear to auscultation, without any respiratory distress. Able to speak full sentences, no accessory muscle use    CARDIAC:   Normal rate. No murmurs, rubs or gallops    ABDOMEN:  Soft, non distended, non tender, BS positive x 4 quadrants, No rebound or guarding, no peritoneal signs, no CVA tenderness, no masses or organomegaly      MUSCULOSKELETAL:   Able to ambulate, Non edematous, with no obvious deformities. Pulses intact distal    SKIN:   Good color, with no significant rashes.  No pallor.  Erythema to right neck with well-demarcated edges      NEURO:  No obvious neurological deficits, normal sensation and strength bilaterally.  Able to follow commands, NIH 0, CN 2-12 intact.        Diagnostics   Labs:  Labs Reviewed - No data to display  Radiographs:  No orders to display             Assessment   In brief, Tre Isidro is a 69 y.o. male who presented to the emergency department with fever, chills, flulike symptoms and erythema to the skin at the site where patient [place]  Initially rash started as a \"bull's-eye rash\".    Plan   Antibiotics, ibuprofen    Differentials   Lyme  Cellulitis  Contact dermatitis    ED Course     Diagnoses as of 05/31/25 1354   Insect bite of throat, initial encounter   Cellulitis of neck       Visit Vitals  /83 (Patient Position: Sitting) " "  Pulse 87   Temp 37.9 °C (100.2 °F)   Resp 18   Ht 1.854 m (6' 1\")   Wt 77.1 kg (170 lb)   SpO2 98%   BMI 22.43 kg/m²   Smoking Status Never   BSA 1.99 m²       Medications   doxycycline (Vibra-Tabs) tablet 100 mg (has no administration in time range)   ibuprofen tablet 600 mg (has no administration in time range)   doxycycline (Vibra-Tabs) tablet 100 mg (has no administration in time range)       Plan of care discussed, stable appearing, was given ibuprofen, doxycycline, prescription sent to patient's pharmacy, will treat for cellulitis and possible Lyme disease, advised to follow-up with his primary care doctor, given strict return precautions, will be discharged in stable condition, patient is agreeable to above plan and is stable for discharge home      Final Impression      1. Insect bite of throat, initial encounter    2. Cellulitis of neck          DISPOSITION  Disposition: Discharge  Patient condition is: Stable    Comment: Please note this report has been produced using speech recognition software and may contain errors related to that system including errors in grammar, punctuation, and spelling, as well as words and phrases that may be inappropriate.  If there are any questions or concerns please feel free to contact the dictating provider for clarification.    Elias Sanchez, APRN-CNP       [1]   Past Medical History:  Diagnosis Date    Arthritis     BPH (benign prostatic hyperplasia)     Cataract     Dental disease     Hyperlipidemia     Personal history of other diseases of the respiratory system     History of acute pulmonary edema    Personal history of other endocrine, nutritional and metabolic disease     History of hyperlipidemia    Personal history of other infectious and parasitic diseases     History of onychomycosis   [2]   Past Surgical History:  Procedure Laterality Date    PROSTATE SURGERY      prostate biopsy. Feb 2025    WISDOM TOOTH EXTRACTION  1970   [3]   Social " History  Socioeconomic History    Marital status:    Tobacco Use    Smoking status: Never     Passive exposure: Never    Smokeless tobacco: Never    Tobacco comments:     Pt denies any illness in past 30 days     No past surgery for pt with GA, denies family reaction or problems with anesthesia     Denies SOB with stairs/daily activity     Ambulates freely   Vaping Use    Vaping status: Never Used   Substance and Sexual Activity    Alcohol use: Yes     Alcohol/week: 4.0 standard drinks of alcohol     Types: 4 Glasses of wine per week     Comment: every few days    Drug use: Not Currently    Sexual activity: Yes     Partners: Female   [4]   Allergies  Allergen Reactions    Sulfamethoxazole-Trimethoprim Rash        GUNNAR Pearce-CNP  05/31/25 4597

## 2025-06-09 DIAGNOSIS — N13.8 BPH WITH OBSTRUCTION/LOWER URINARY TRACT SYMPTOMS: ICD-10-CM

## 2025-06-09 DIAGNOSIS — Z01.818 PREOP TESTING: ICD-10-CM

## 2025-06-09 DIAGNOSIS — N33 BLADDER DISORDERS IN DISEASES CLASSIFIED ELSEWHERE: ICD-10-CM

## 2025-06-09 DIAGNOSIS — N40.1 BPH WITH OBSTRUCTION/LOWER URINARY TRACT SYMPTOMS: ICD-10-CM

## 2025-06-12 ENCOUNTER — PRE-ADMISSION TESTING (OUTPATIENT)
Dept: PREADMISSION TESTING | Facility: HOSPITAL | Age: 70
End: 2025-06-12
Payer: MEDICARE

## 2025-06-12 VITALS
WEIGHT: 170 LBS | SYSTOLIC BLOOD PRESSURE: 134 MMHG | TEMPERATURE: 98.4 F | HEIGHT: 73 IN | OXYGEN SATURATION: 72 % | RESPIRATION RATE: 16 BRPM | DIASTOLIC BLOOD PRESSURE: 89 MMHG | HEART RATE: 72 BPM | BODY MASS INDEX: 22.53 KG/M2

## 2025-06-12 DIAGNOSIS — Z01.818 PREOP TESTING: ICD-10-CM

## 2025-06-12 PROCEDURE — 99203 OFFICE O/P NEW LOW 30 MIN: CPT | Performed by: NURSE PRACTITIONER

## 2025-06-12 RX ORDER — IBUPROFEN 100 MG/5ML
1000 SUSPENSION, ORAL (FINAL DOSE FORM) ORAL DAILY
COMMUNITY

## 2025-06-12 ASSESSMENT — DUKE ACTIVITY SCORE INDEX (DASI)
CAN YOU HAVE SEXUAL RELATIONS: YES
CAN YOU RUN A SHORT DISTANCE: YES
CAN YOU WALK INDOORS, SUCH AS AROUND YOUR HOUSE: YES
CAN YOU DO MODERATE WORK AROUND THE HOUSE LIKE VACUUMING, SWEEPING FLOORS OR CARRYING GROCERIES: YES
CAN YOU DO YARD WORK LIKE RAKING LEAVES, WEEDING OR PUSHING A MOWER: YES
CAN YOU TAKE CARE OF YOURSELF (EAT, DRESS, BATHE, OR USE TOILET): YES
CAN YOU DO HEAVY WORK AROUND THE HOUSE LIKE SCRUBBING FLOORS OR LIFTING AND MOVING HEAVY FURNITURE: YES
CAN YOU WALK A BLOCK OR TWO ON LEVEL GROUND: YES
CAN YOU DO LIGHT WORK AROUND THE HOUSE LIKE DUSTING OR WASHING DISHES: YES
CAN YOU CLIMB A FLIGHT OF STAIRS OR WALK UP A HILL: YES
CAN YOU PARTICIPATE IN MODERATE RECREATIONAL ACTIVITIES LIKE GOLF, BOWLING, DANCING, DOUBLES TENNIS OR THROWING A BASEBALL OR FOOTBALL: YES

## 2025-06-12 NOTE — PREPROCEDURE INSTRUCTIONS
Medication List            Accurate as of June 12, 2025  3:40 PM. Always use your most recent med list.                aspirin 81 mg EC tablet  Additional Medication Adjustments for Surgery: Take last dose 7 days before surgery  Notes to patient: last dose preoperatively on 6/19/25     b complex vitamins capsule  Additional Medication Adjustments for Surgery: Take last dose 7 days before surgery  Notes to patient: last dose preoperatively on 6/19/25     cholecalciferol 125 mcg (5,000 units) tablet  Commonly known as: Vitamin D-3  Additional Medication Adjustments for Surgery: Take last dose 7 days before surgery  Notes to patient: last dose preoperatively on 6/19/25     DIGESTIVE ENZYMES (PLANT) ORAL  Additional Medication Adjustments for Surgery: Take last dose 7 days before surgery  Notes to patient: last dose preoperatively on 6/19/25     NON FORMULARY  Additional Medication Adjustments for Surgery: Take last dose 7 days before surgery  Notes to patient: last dose preoperatively on 6/19/25     NON FORMULARY  Additional Medication Adjustments for Surgery: Take last dose 7 days before surgery  Notes to patient: last dose preoperatively on 6/19/25     Vitamin C 1,000 mg tablet  Generic drug: ascorbic acid  Additional Medication Adjustments for Surgery: Take last dose 7 days before surgery  Notes to patient: last dose preoperatively on 6/19/25            NPO Instructions:     Do not eat any food after midnight the night before your surgery/procedure.  You may have clear liquids until TWO hours before surgery/procedure. This includes water, black tea/coffee, (no milk or cream) apple juice and electrolyte drinks (Gatorade).  You may chew gum up to TWO hours before your surgery/procedure.     Additional Instructions:      Seven/Six Days before Surgery:  Review your medication instructions, stop indicated medications  Five Days before Surgery:  Review your medication instructions, stop indicated medications  Three  Days before Surgery:  Review your medication instructions, stop indicated medications  The Day before Surgery:  No smoking or alcohol use 24 hours before surgery  Review your medication instructions, stop indicated medications  You will be contacted regarding the time of your arrival to facility and surgery time  Do not eat any food after Midnight  Day of Surgery:  Review your medication instructions, take indicated medications  If you have diabetes, please check your fasting blood sugar upon awakening.  If fasting blood sugar is <80 mg/dl, drink 100 ml of apple juice, time limit of 2 hours before  You may have clear liquids until TWO hours before surgery/procedure.  This includes water, black tea/coffee, (no milk or cream) apple juice and electrolyte drinks (Gatorade)  You may chew gum up to TWO hours before your surgery/procedure  Wear  comfortable loose fitting clothing  Do not use moisturizers, creams, lotions or perfume  All jewelry and valuables should be left at home     CONTACT SURGEON'S OFFICE IF YOU DEVELOP:  * Fever = 100.4 F   * New respiratory symptoms (e.g. cough, shortness of breath, respiratory distress, sore throat)  * Recent loss of taste or smell  *Flu like symptoms such as headache, fatigue or gastrointestinal symptoms  * You develop any open sores, shingles, burning or painful urination   AND/OR:  * You no longer wish to have the surgery.  * Any other personal circumstances change that may lead to the need to cancel or defer this surgery.  *You were admitted to any hospital within one week of your planned procedure.     SMOKING:  *Quitting smoking can make a huge difference to your health and recovery from surgery.    *If you need help with quitting, call 4-800QUIT-NOW.     THE DAY BEFORE SURGERY:  *Do not eat any food after midnight the night before your surgery.   *You may have up to TEN OUNCES of clear liquids until TWO hours before your instructed ARRIVAL TIME to hospital. This includes  water, black tea/coffee, (no milk or cream) apple juice, clear broth and electrolyte drinks (Gatorade). Please avoid clear liquids that are red in color.   *You may chew gum/mints up to TWO hours before your surgery/procedure.     SURGICAL TIME:  *You will be contacted between 2 p.m. and 3 p.m. the business day before your surgery with your arrival time.  *If you haven't received a call by 3pm, call [phone]  *Scheduled surgery times may change and you will be notified if this occurs-check your personal voicemail for any updates.     ON THE MORNING OF SURGERY:  *Wear comfortable, loose fitting clothing.   *Do not use moisturizers, creams, lotions or perfume.  *All jewelry and valuables should be left at home.  *Prosthetic devices such as contact lenses, hearing aids, dentures, eyelash extensions, hairpins and body piercing must be removed before surgery.     BRING WITH YOU:  *Photo ID and insurance card  *Current list of medications and allergies  *Pacemaker/Defibrillator/Heart stent cards  *CPAP machine and mask  *Slings/splints/crutches  *Copy of your complete Advanced Directive/DHPOA-if applicable  *Neurostimulator implant remote     PARKING AND ARRIVAL:  *Check in at the Main Entrance desk and let them know you are here for surgery.     IF YOU ARE HAVING OUTPATIENT/SAME DAY SURGERY:  *A responsible adult MUST accompany you at the time of discharge and stay with you for 24 hours after your surgery.  *You may NOT drive yourself home after surgery.  *You may use a taxi or ride sharing service ([employer], Uber) to return home ONLY if you are accompanied by a friend or family member.  *Instructions for resuming your medications will be provided by your surgeon.     Thank you for coming to Pre Admission testing.      If I have prescribed medication please don't forget to  at your pharmacy.      Any questions about today's visit call [phone] and leave a message in the general mailbox.     Patient  instructed to ambulate as soon as possible postoperatively to decrease thromboembolic risk.     [facility] you [facility].  If you have any changes to your health condition, please call the surgeons office to alert them and give them details of your symptoms.        Preoperative Fasting Guidelines     Why must I stop eating and drinking near surgery time?  With sedation, food or liquid in your stomach can enter your lungs causing serious complications  Increases nausea and vomiting     When do I need to stop eating and drinking before my surgery?  Do not eat any food after midnight the night before your surgery/procedure.  You may have up to TEN ounces of clear liquid until TWO hours before your instructed arrival time to the hospital.  This includes water, black tea/coffee, (no milk or cream) apple juice, and electrolyte drinks (Gatorade)  You may chew gum until TWO hours before your surgery/procedure        Additional Instructions:      The Day before Surgery:  -Review your medication instructions, stop indicated medications  -You will be contacted in the evening regarding the time of your arrival to facility and surgery time     Day of Surgery:  -Review your medication instructions, take indicated medications  -Wear comfortable loose fitting clothing  -Do not use moisturizers, creams, lotions or perfume  -All jewelry and valuables should be left at home                   Preoperative Brain Exercises     What are brain exercises?  A brain exercise is any activity that engages your thinking (cognitive) skills.     What types of activities are considered brain exercises?  Jigsaw puzzles, crossword puzzles, word jumble, memory games, word search, and many more.  Many can be found free online or on your phone via a mobile rusty.     Why should I do brain exercises before my surgery?  More recent research has shown brain exercise before surgery can lower the risk of postoperative  delirium (confusion) which can be especially important for older adults.  Patients who did brain exercises for 5 to 10 minutes/day in the days before surgery, cut their risk of postoperative delirium in half up to 1 week after surgery.                         [facility]     Preoperative Deep Breathing Exercises     Why it is important to do deep breathing exercises before my surgery?  Deep breathing exercises strengthen your breathing muscles.  This helps you to recover after your surgery and decreases the chance of breathing complications.        How are the deep breathing exercises done?  Sit straight with your back supported.  Breathe in deeply and slowly through your nose. Your lower rib cage should expand and your abdomen may move forward.  Hold that breath for 3 to 5 seconds.  Breathe out through pursed lips, slowly and completely.  Rest and repeat 10 times every hour while awake.  Rest longer if you become dizzy or lightheaded.                      [facility]     Preoperative Deep Breathing Exercises     Why it is important to do deep breathing exercises before my surgery?  Deep breathing exercises strengthen your breathing muscles.  This helps you to recover after your surgery and decreases the chance of breathing complications.        How are the deep breathing exercises done?  Sit straight with your back supported.  Breathe in deeply and slowly through your nose. Your lower rib cage should expand and your abdomen may move forward.  Hold that breath for 3 to 5 seconds.  Breathe out through pursed lips, slowly and completely.  Rest and repeat 10 times every hour while awake.  Rest longer if you become dizzy or lightheaded.        Patient Information: Incentive Spirometer  What is an incentive spirometer?  An incentive spirometer is a device used before and after surgery to “exercise” your lungs.  It helps you to take deeper breaths to expand your lungs.   Below is an example of a basic incentive spirometer.  The device you receive may differ slightly but they all function the same.    Why do I need to use an incentive spirometer?  Using your incentive spirometer prepares your lungs for surgery and helps prevent lung problems after surgery.  How do I use my incentive spirometer?  When you're using your incentive spirometer, make sure to breathe through your mouth. If you breathe through your nose, the incentive spirometer won't work properly. You can hold your nose if you have trouble.  If you feel dizzy at any time, stop and rest. Try again at a later time.  Follow the steps below:  Set up your incentive spirometer, expand the flexible tubing and connect to the outlet.  Sit upright in a chair or bed. Hold the incentive spirometer at eye level.   Put the mouthpiece in your mouth and close your lips tightly around it. Slowly breathe out (exhale) completely.  Breathe in (inhale) slowly through your mouth as deeply as you can. As you take a breath, you will see the piston rise inside the large column. While the piston rises, the indicator should move upwards. It should stay in between the 2 arrows (see Figure).  Try to get the piston as high as you can, while keeping the indicator between the arrows.   If the indicator doesn't stay between the arrows, you're breathing either too fast or too slow.  When you get it as high as you can, hold your breath for 10 seconds, or as long as possible. While you're holding your breath, the piston will slowly fall to the base of the spirometer.  Once the piston reaches the bottom of the spirometer, breathe out slowly through your mouth. Rest for a few seconds.  Repeat 10 times. Try to get the piston to the same level with each breath.  Repeat every hour while awake  You can carefully clean the outside of the mouthpiece with an alcohol wipe or soap and water.       Patient and Family Education             Ways You Can Help Prevent Blood  Clots                    This handout explains some simple things you can do to help prevent blood clots.      Blood clots are blockages that can form in the body's veins. When a blood clot forms in your deep veins, it may be called a deep vein thrombosis, or DVT for short. Blood clots can happen in any part of the body where blood flows, but they are most common in the arms and legs. If a piece of a blood clot breaks free and travels to the lungs, it is called a pulmonary embolus (PE). A PE can be a very serious problem.         Being in the hospital or having surgery can raise your chances of getting a blood clot because you may not be well enough to move around as much as you normally do.         Ways you can help prevent blood clots in the hospital           Wearing SCDs. SCDs stands for Sequential Compression Devices.   SCDs are special sleeves that wrap around your legs  They attach to a pump that fills them with air to gently squeeze your legs every few minutes.   This helps return the blood in your legs to your heart.   SCDs should only be taken off when walking or bathing.   SCDs may not be comfortable, but they can help save your life.                                            Wearing compression stockings - if your doctor orders them. These special snug fitting stockings gently squeeze your legs to help blood flow.       Walking. Walking helps move the blood in your legs.   If your doctor says it is ok, try walking the halls at least   5 times a day. Ask us to help you get up, so you don't fall.      Taking any blood thinning medicines your doctor orders.        Page 1 of 2            [facility]; 3/23   Ways you can help prevent blood clots at home         Wearing compression stockings - if your doctor orders them. ? Walking - to help move the blood in your legs.       Taking any blood thinning medicines your doctor orders.      Signs of a blood clot or PE        Tell your doctor or nurse know  right away if you have of the problems listed below.    If you are at home, seek medical care right away. Call 911 for chest pain or problems breathing.                Signs of a blood clot (DVT) - such as pain,  swelling, redness or warmth in your arm or leg      Signs of a pulmonary embolism (PE) - such as chest pain or feeling short of breath      Preoperative Clearances  -If you are informed by the preadmission testing team that you need clearance for your surgery, please reach out to the provider in question to assisting accommodating obtaining your clearance.   -Please have you provider fax your clearance letter to [phone] if needed.   -A blank clearance letter will be provided to you if indicated.     Anticoagulation  -If you are on oral anticoagulation such as Coumadin, Eliquis, Xarelto, Plavix, Brilinta, Pradaxa; we will need to obtain preoperative anticoagulation instructions from the prescribing provider.   -We will reach out to the prescriber but do encourage you to call their office as well as it will increase the chances of getting the necessary information.   -We will contact you with the instruction once we obtain them.

## 2025-06-12 NOTE — CPM/PAT H&P
[facility]/PAT Evaluation       Name: Tre Isidro (Tre Isidro)  /Age: 1955/69 y.o.     In-Person       Chief Complaint: BPH with obstruction/lower urinary tract symptoms    HPI  Patient is an alert and oriented 69 year old male scheduled for a  Greenlight Photoselective Vaporization of Prostate on 25 with Dr. Garcia for  BPH with obstruction/lower urinary tract symptoms. PMHX includes HLD, BPH, thrombocytopenia. Presents to [facility] today for perioperative risk stratification and optimization.     Medical History[1]    Surgical History[2]    Patient  reports being sexually active and has had partner(s) who are female.    Family History[3]    Allergies[4]    Prior to Admission medications    Medication Sig Start Date End Date Taking? Authorizing Provider   aspirin 81 mg EC tablet Take 1 tablet (81 mg) by mouth once daily.    Historical Provider, MD   b complex vitamins capsule Take 1 capsule by mouth once daily.    Historical Provider, MD   cholecalciferol (Vitamin D-3) 5,000 Units tablet Take 1 tablet (125 mcg) by mouth once daily. 18   Historical Provider, MD   cranberry extract 200 mg capsule Take by mouth once daily.    Historical Provider, MD   doxycycline (Vibramycin) 100 mg capsule Take 1 capsule (100 mg) by mouth 2 times a day for 10 days. Take with at least 8 ounces (large glass) of water, do not lie down for 30 minutes after 5/31/25 6/10/25  GUNNAR Pearce-CNP   NON FORMULARY 2 times a day. cholesterax    Historical Provider, MD   NON FORMULARY once daily as needed (pain). Zyslamend for joint pain    Historical Provider, MD   ascorbic acid (Vitamin C) 125 mg chewable tablet Chew 1 tablet (125 mg) once daily. 22  Historical Provider, MD   multivit-min-FA-lycopen-lutein (Essential Man 50 Plus) 0.4-2-250 mg-mg-mcg tablet Take 1 tablet by mouth once daily. 18  Historical Provider, MD   NON FORMULARY once daily. Balance of nature  25  Historical Provider, MD  "  tadalafil 20 mg tablet Take 1 tablet (20 mg) by mouth.  Patient not taking: Reported on 5/30/2025 9/22/23 6/12/25  Historical Provider, MD        Visit Vitals  /89   Pulse 72   Temp 36.9 °C (98.4 °F)   Resp 16   Ht 1.854 m (6' 1\")   Wt 77.1 kg (170 lb)   SpO2 (!) 72%   BMI 22.43 kg/m²   Smoking Status Never   BSA 1.99 m²      Review of Systems   Constitutional: Negative for chills, decreased appetite, diaphoresis, fever and malaise/fatigue.   Eyes:  Negative for blurred vision and double vision.   Cardiovascular:  Negative for chest pain, claudication, cyanosis, dyspnea on exertion, irregular heartbeat, leg swelling, near-syncope and palpitations.   Respiratory:  Negative for cough, hemoptysis, shortness of breath and wheezing.    Endocrine: Negative for cold intolerance, heat intolerance, polydipsia, polyphagia and polyuria.   Gastrointestinal:  Negative for abdominal pain, constipation, diarrhea, dysphagia, nausea and vomiting.   Genitourinary:  Negative for bladder incontinence, dysuria, hematuria, incomplete emptying, nocturia, frequency, pelvic pain and urgency.   Neurological:  Negative for headaches, light-headedness, paresthesias, sensory change and weakness.   Psychiatric/Behavioral:  Negative for altered mental status.    Musculoskeletal: Negative for myalgias, arthralgias     Vitals and nursing note reviewed.     Physical exam  Constitutional:       Appearance: Normal appearance. He is Normal.   HENT:      Head: Normocephalic and atraumatic.      Mouth/Throat:      Mouth: Mucous membranes are moist.      Pharynx: Oropharynx is clear.   Eyes:      Extraocular Movements: Extraocular movements intact.      Conjunctiva/sclera: Conjunctivae normal.      Pupils: Pupils are equal, round, and reactive to light.   Cardiovascular:      PMI at left midclavicular line. Normal rate. Regular rhythm. Normal S1. Normal S2.       Murmurs: There is no murmur.      No gallop.  No click. No rub.       No audible " carotid bruit     No lower extremity edema on exam  Pulmonary:      Effort: Pulmonary effort is normal.      Breath sounds: Normal breath sounds.   Abdominal:      General: Abdomen is flat. Bowel sounds are normal.      Palpations: Abdomen is soft and non-tender  Musculoskeletal:      Cervical back: Normal range of motion and neck supple.   Skin:     General: Skin is warm and dry.      Capillary Refill: Capillary refill takes less than 2 seconds.   Neurological:      General: No focal deficit present.      Mental Status: He is alert and oriented to person, place, and time. Mental status is at baseline.   Psychiatric:         Mood and Affect: Mood normal.         Behavior: Behavior normal.         Thought Content: Thought content normal.         Judgment: Judgment normal.     Vitals and nursing note reviewed. Physical exam within normal limits.     DASI Risk Score      Flowsheet Row Pre-Admission Testing from 6/12/2025 in [facility]   Can you take care of yourself (eat, dress, bathe, or use toilet)?  2.75 filed at 06/12/2025 1600   Can you walk indoors, such as around your house? 1.75 filed at 06/12/2025 1600   Can you walk a block or two on level ground?  2.75 filed at 06/12/2025 1600   Can you climb a flight of stairs or walk up a hill? 5.5 filed at 06/12/2025 1600   Can you run a short distance? 8 filed at 06/12/2025 1600   Can you do light work around the house like dusting or washing dishes? 2.7 filed at 06/12/2025 1600   Can you do moderate work around the house like vacuuming, sweeping floors or carrying groceries? 3.5 filed at 06/12/2025 1600   Can you do heavy work around the house like scrubbing floors or lifting and moving heavy furniture?  8 filed at 06/12/2025 1600   Can you do yard work like raking leaves, weeding or pushing a mower? 4.5 filed at 06/12/2025 1600   Can you have sexual relations? 5.25 filed at 06/12/2025 1600   Can you participate in moderate recreational activities like  golf, bowling, dancing, doubles tennis or throwing a baseball or football? 6 filed at 06/12/2025 1600          Caprini DVT Assessment      Flowsheet Row Pre-Admission Testing from 6/12/2025 in [facility]   DVT Score (IF A SCORE IS NOT CALCULATING, MUST SELECT A BMI TO COMPLETE) 5 filed at 06/12/2025 1558   Surgical Factors Major surgery planned, including arthroscopic and laproscopic (1-2 hours) filed at 06/12/2025 1558   BMI (BMI MUST BE CHOSEN) 30 or less filed at 06/12/2025 1558          Modified Frailty Index    No data to display       [id number] Stroke Risk Points  Current as of 35 minutes ago        N/A 0 to 9 Points:      Last Change: N/A          The [id number] risk score (Lip JOSE E, et al. 2009. © 2010 [employer]) quantifies the risk of stroke for a patient with atrial fibrillation. For patients without atrial fibrillation or under the age of 18 this score appears as N/A. Higher score values generally indicate higher risk of stroke.        This score is not applicable to this patient. Components are not calculated.          Revised Cardiac Risk Index      Flowsheet Row Pre-Admission Testing from 6/12/2025 in [facility]   High-Risk Surgery (Intraperitoneal, Intrathoracic,Suprainguinal vascular) 0 filed at 06/12/2025 1600   History of ischemic heart disease (History of MI, History of positive exercuse test, Current chest paint considered due to myocardial ischemia, Use of nitrate therapy, ECG with pathological Q Waves) 0 filed at 06/12/2025 1600   History of congestive heart failure (pulmonary edemia, bilateral rales or S3 gallop, Paroxysmal nocturnal dyspnea, CXR showing pulmonary vascular redistribution) 0 filed at 06/12/2025 1600   History of cerebrovascular disease (Prior TIA or stroke) 0 filed at 06/12/2025 1600   Pre-operative insulin treatment 0 filed at 06/12/2025 1600   Pre-operative creatinine>2 mg/dl 0 filed at 06/12/2025 1600   Revised  Cardiac Risk Calculator 0 filed at 06/12/2025 1600          Apfel Simplified Score    No data to display       Risk Analysis Index Results This Encounter    No data found in the last 10 encounters.       Stop Bang Score      Flowsheet Row Pre-Admission Testing from 6/12/2025 in [facility] Clinical Support from 5/30/2025 in [facility]   Do you snore loudly? 0 filed at 06/12/2025 1537 1 filed at 05/30/2025 1303   Do you often feel tired or fatigued after your sleep? 0 filed at 06/12/2025 1537 0 filed at 05/30/2025 1303   Has anyone ever observed you stop breathing in your sleep? 0 filed at 06/12/2025 1537 0 filed at 05/30/2025 1303   Do you have or are you being treated for high blood pressure? 0 filed at 06/12/2025 1537 0 filed at 05/30/2025 1303   Recent BMI (Calculated) 22.4 filed at 06/12/2025 1537 22.4 filed at 05/30/2025 1303   Is BMI greater than 35 kg/m2? 0=No filed at 06/12/2025 1537 0=No filed at 05/30/2025 1303   Age older than 50 years old? 1=Yes filed at 06/12/2025 1537 1=Yes filed at 05/30/2025 1303   Is your neck circumference greater than 17 inches (Male) or 16 inches (Female)? 0 filed at 06/12/2025 1537 --   Gender - Male 1=Yes filed at 06/12/2025 1537 1=Yes filed at 05/30/2025 1303   STOP-BANG Total Score 2 filed at 06/12/2025 1537 --          Prodigy: High Risk  Total Score: 16              Prodigy Age Score      Prodigy Gender Score          ARISCAT Score for Postoperative Pulmonary Complications    No data to display       Robbins Perioperative Risk for Myocardial Infarction or Cardiac Arrest (FRANKLYN)      Flowsheet Row Pre-Admission Testing from 6/12/2025 in [facility]   Calculated Age Score 1.34 filed at 06/12/2025 1601   Functional Status  0 filed at 06/12/2025 1601   ASA Class  -3.29 filed at 06/12/2025 1601   Creatinine 0 filed at 06/12/2025 1601   Type of Procedure  -0.26 filed at 06/12/2025 1601   FRANKLYN Total Score  -7.46 filed at 06/12/2025  1601   FRANKLYN % 0.06 filed at 06/12/2025 1601          Assessment & Plan:    Neuro:  No diagnosis or significant findings on chart review or clinical presentation and evaluation.     HEENT/Airway:  No diagnosis or significant findings on chart review or clinical presentation and evaluation.   STOP-BANG Score-2 points low risk for MARTHA    Mallampati::  II    TM distance::  >3 FB    Neck ROM::  Full  Dentures-denies  Crowns-reports x 1  Implants-denies    Cardiovascular:  HLD (asa)  METS: 9  RCRI: 0 points, 3.9%  risk for postoperative MACE   FRANKLYN: 0.06% risk for postoperative MACE    Pulmonary:  No diagnosis or significant findings on chart review or clinical presentation and evaluation.   ARISCAT: <26 points, 1.6% risk of in-hospital postoperative pulmonary complication  PRODIGY: Moderate risk for opioid induced respiratory depression  Smoking History-He has never smoked.  Deep breathing handout given    Renal/Urinary:    BPH   the patient is at increased risk of perioperative renal complications secondary to male sex. Preventative measures include BP monitoring, medication compliance, and hydration management.   CMP-Pending  Creatinine-  GFR-    Endocrine:  No diagnosis or significant findings on chart review or clinical presentation and evaluation.     Hematologic/Immunology:  thrombocytopenia  The patient is not a Jehovah’s witness and will accept blood and blood products if medically indicated.   History of previous blood transfusions No  CBC  HGB-  Caprini Score 5, patient at Low for postoperative DVT. Pt supplied education/VTE handout  Anticoagulation use: Yes     Gastrointestinal:   No diagnosis or significant findings on chart review or clinical presentation and evaluation.   Recreational drug use: none  Alcohol use 4 glasses of wine per week    Infectious disease:   No diagnosis or significant findings on chart review or clinical presentation and evaluation.     Musculoskeletal:   No diagnosis or significant  findings on chart review or clinical presentation and evaluation.   JHFRAT score- 3 points. low risk for falls    Anesthesia:  ASA 2 - Patient with mild systemic disease with no functional limitations  Anticipated anesthesia-general  History of General anesthesia- yes  Complications- No prior anesthesia  No family history of anesthesia complications    Labs & Imaging ordered:  CBC, CMP,  UA - labs in by Dr. Baltazar pt instructed to obtain labs asap   Nickel/metal allergy-negative  Shellfish allergy-negative    Discussed with patient medication instructions, NPO guidelines, and any questions or concerns.     time spent 45 minutes  All resulted testing from PAT was forwarded to the surgeon and the patient's PCP if applicable.           [1]   Past Medical History:  Diagnosis Date    Arthritis     BPH (benign prostatic hyperplasia)     Cataract     Dental disease     Hyperlipidemia     Personal history of other diseases of the respiratory system     History of acute pulmonary edema    Personal history of other endocrine, nutritional and metabolic disease     History of hyperlipidemia    Personal history of other infectious and parasitic diseases     History of onychomycosis   [2]   Past Surgical History:  Procedure Laterality Date    PROSTATE SURGERY      prostate biopsy. 2025    WISDOM TOOTH EXTRACTION  1970   [3]   Family History  Problem Relation Name Age of Onset    Arthritis Mother Rose Marie         Mother  at age 80    Diabetes Mother Rose Marie     Stroke Mother Rose Marie     Heart failure Mother Rose Marie     Osteoarthritis Mother Rose Marie     Heart disease Father Rajinder     Heart attack Father Rajinder     Prostate cancer Other family HX    [4]   Allergies  Allergen Reactions    Sulfamethoxazole-Trimethoprim Rash

## 2025-06-12 NOTE — H&P (VIEW-ONLY)
[facility]/PAT Evaluation       Name: Tre Isidro (Tre Isidro)  /Age: 1955/69 y.o.     In-Person       Chief Complaint: BPH with obstruction/lower urinary tract symptoms    HPI  Patient is an alert and oriented 69 year old male scheduled for a  Greenlight Photoselective Vaporization of Prostate on 25 with Dr. Garcia for  BPH with obstruction/lower urinary tract symptoms. PMHX includes HLD, BPH, thrombocytopenia. Presents to [facility] today for perioperative risk stratification and optimization.     Medical History[1]    Surgical History[2]    Patient  reports being sexually active and has had partner(s) who are female.    Family History[3]    Allergies[4]    Prior to Admission medications    Medication Sig Start Date End Date Taking? Authorizing Provider   aspirin 81 mg EC tablet Take 1 tablet (81 mg) by mouth once daily.    Historical Provider, MD   b complex vitamins capsule Take 1 capsule by mouth once daily.    Historical Provider, MD   cholecalciferol (Vitamin D-3) 5,000 Units tablet Take 1 tablet (125 mcg) by mouth once daily. 18   Historical Provider, MD   cranberry extract 200 mg capsule Take by mouth once daily.    Historical Provider, MD   doxycycline (Vibramycin) 100 mg capsule Take 1 capsule (100 mg) by mouth 2 times a day for 10 days. Take with at least 8 ounces (large glass) of water, do not lie down for 30 minutes after 5/31/25 6/10/25  GUNNAR Pearce-CNP   NON FORMULARY 2 times a day. cholesterax    Historical Provider, MD   NON FORMULARY once daily as needed (pain). Zyslamend for joint pain    Historical Provider, MD   ascorbic acid (Vitamin C) 125 mg chewable tablet Chew 1 tablet (125 mg) once daily. 22  Historical Provider, MD   multivit-min-FA-lycopen-lutein (Essential Man 50 Plus) 0.4-2-250 mg-mg-mcg tablet Take 1 tablet by mouth once daily. 18  Historical Provider, MD   NON FORMULARY once daily. Balance of nature  25  Historical Provider, MD  "  tadalafil 20 mg tablet Take 1 tablet (20 mg) by mouth.  Patient not taking: Reported on 5/30/2025 9/22/23 6/12/25  Historical Provider, MD        Visit Vitals  /89   Pulse 72   Temp 36.9 °C (98.4 °F)   Resp 16   Ht 1.854 m (6' 1\")   Wt 77.1 kg (170 lb)   SpO2 (!) 72%   BMI 22.43 kg/m²   Smoking Status Never   BSA 1.99 m²      Review of Systems   Constitutional: Negative for chills, decreased appetite, diaphoresis, fever and malaise/fatigue.   Eyes:  Negative for blurred vision and double vision.   Cardiovascular:  Negative for chest pain, claudication, cyanosis, dyspnea on exertion, irregular heartbeat, leg swelling, near-syncope and palpitations.   Respiratory:  Negative for cough, hemoptysis, shortness of breath and wheezing.    Endocrine: Negative for cold intolerance, heat intolerance, polydipsia, polyphagia and polyuria.   Gastrointestinal:  Negative for abdominal pain, constipation, diarrhea, dysphagia, nausea and vomiting.   Genitourinary:  Negative for bladder incontinence, dysuria, hematuria, incomplete emptying, nocturia, frequency, pelvic pain and urgency.   Neurological:  Negative for headaches, light-headedness, paresthesias, sensory change and weakness.   Psychiatric/Behavioral:  Negative for altered mental status.    Musculoskeletal: Negative for myalgias, arthralgias     Vitals and nursing note reviewed.     Physical exam  Constitutional:       Appearance: Normal appearance. He is Normal.   HENT:      Head: Normocephalic and atraumatic.      Mouth/Throat:      Mouth: Mucous membranes are moist.      Pharynx: Oropharynx is clear.   Eyes:      Extraocular Movements: Extraocular movements intact.      Conjunctiva/sclera: Conjunctivae normal.      Pupils: Pupils are equal, round, and reactive to light.   Cardiovascular:      PMI at left midclavicular line. Normal rate. Regular rhythm. Normal S1. Normal S2.       Murmurs: There is no murmur.      No gallop.  No click. No rub.       No audible " carotid bruit     No lower extremity edema on exam  Pulmonary:      Effort: Pulmonary effort is normal.      Breath sounds: Normal breath sounds.   Abdominal:      General: Abdomen is flat. Bowel sounds are normal.      Palpations: Abdomen is soft and non-tender  Musculoskeletal:      Cervical back: Normal range of motion and neck supple.   Skin:     General: Skin is warm and dry.      Capillary Refill: Capillary refill takes less than 2 seconds.   Neurological:      General: No focal deficit present.      Mental Status: He is alert and oriented to person, place, and time. Mental status is at baseline.   Psychiatric:         Mood and Affect: Mood normal.         Behavior: Behavior normal.         Thought Content: Thought content normal.         Judgment: Judgment normal.     Vitals and nursing note reviewed. Physical exam within normal limits.     DASI Risk Score      Flowsheet Row Pre-Admission Testing from 6/12/2025 in [facility]   Can you take care of yourself (eat, dress, bathe, or use toilet)?  2.75 filed at 06/12/2025 1600   Can you walk indoors, such as around your house? 1.75 filed at 06/12/2025 1600   Can you walk a block or two on level ground?  2.75 filed at 06/12/2025 1600   Can you climb a flight of stairs or walk up a hill? 5.5 filed at 06/12/2025 1600   Can you run a short distance? 8 filed at 06/12/2025 1600   Can you do light work around the house like dusting or washing dishes? 2.7 filed at 06/12/2025 1600   Can you do moderate work around the house like vacuuming, sweeping floors or carrying groceries? 3.5 filed at 06/12/2025 1600   Can you do heavy work around the house like scrubbing floors or lifting and moving heavy furniture?  8 filed at 06/12/2025 1600   Can you do yard work like raking leaves, weeding or pushing a mower? 4.5 filed at 06/12/2025 1600   Can you have sexual relations? 5.25 filed at 06/12/2025 1600   Can you participate in moderate recreational activities like  golf, bowling, dancing, doubles tennis or throwing a baseball or football? 6 filed at 06/12/2025 1600          Caprini DVT Assessment      Flowsheet Row Pre-Admission Testing from 6/12/2025 in [facility]   DVT Score (IF A SCORE IS NOT CALCULATING, MUST SELECT A BMI TO COMPLETE) 5 filed at 06/12/2025 1558   Surgical Factors Major surgery planned, including arthroscopic and laproscopic (1-2 hours) filed at 06/12/2025 1558   BMI (BMI MUST BE CHOSEN) 30 or less filed at 06/12/2025 1558          Modified Frailty Index    No data to display       [id number] Stroke Risk Points  Current as of 35 minutes ago        N/A 0 to 9 Points:      Last Change: N/A          The [id number] risk score (Lip JOSE E, et al. 2009. © 2010 [employer]) quantifies the risk of stroke for a patient with atrial fibrillation. For patients without atrial fibrillation or under the age of 18 this score appears as N/A. Higher score values generally indicate higher risk of stroke.        This score is not applicable to this patient. Components are not calculated.          Revised Cardiac Risk Index      Flowsheet Row Pre-Admission Testing from 6/12/2025 in [facility]   High-Risk Surgery (Intraperitoneal, Intrathoracic,Suprainguinal vascular) 0 filed at 06/12/2025 1600   History of ischemic heart disease (History of MI, History of positive exercuse test, Current chest paint considered due to myocardial ischemia, Use of nitrate therapy, ECG with pathological Q Waves) 0 filed at 06/12/2025 1600   History of congestive heart failure (pulmonary edemia, bilateral rales or S3 gallop, Paroxysmal nocturnal dyspnea, CXR showing pulmonary vascular redistribution) 0 filed at 06/12/2025 1600   History of cerebrovascular disease (Prior TIA or stroke) 0 filed at 06/12/2025 1600   Pre-operative insulin treatment 0 filed at 06/12/2025 1600   Pre-operative creatinine>2 mg/dl 0 filed at 06/12/2025 1600   Revised  Cardiac Risk Calculator 0 filed at 06/12/2025 1600          Apfel Simplified Score    No data to display       Risk Analysis Index Results This Encounter    No data found in the last 10 encounters.       Stop Bang Score      Flowsheet Row Pre-Admission Testing from 6/12/2025 in [facility] Clinical Support from 5/30/2025 in [facility]   Do you snore loudly? 0 filed at 06/12/2025 1537 1 filed at 05/30/2025 1303   Do you often feel tired or fatigued after your sleep? 0 filed at 06/12/2025 1537 0 filed at 05/30/2025 1303   Has anyone ever observed you stop breathing in your sleep? 0 filed at 06/12/2025 1537 0 filed at 05/30/2025 1303   Do you have or are you being treated for high blood pressure? 0 filed at 06/12/2025 1537 0 filed at 05/30/2025 1303   Recent BMI (Calculated) 22.4 filed at 06/12/2025 1537 22.4 filed at 05/30/2025 1303   Is BMI greater than 35 kg/m2? 0=No filed at 06/12/2025 1537 0=No filed at 05/30/2025 1303   Age older than 50 years old? 1=Yes filed at 06/12/2025 1537 1=Yes filed at 05/30/2025 1303   Is your neck circumference greater than 17 inches (Male) or 16 inches (Female)? 0 filed at 06/12/2025 1537 --   Gender - Male 1=Yes filed at 06/12/2025 1537 1=Yes filed at 05/30/2025 1303   STOP-BANG Total Score 2 filed at 06/12/2025 1537 --          Prodigy: High Risk  Total Score: 16              Prodigy Age Score      Prodigy Gender Score          ARISCAT Score for Postoperative Pulmonary Complications    No data to display       Robbins Perioperative Risk for Myocardial Infarction or Cardiac Arrest (FRANKLYN)      Flowsheet Row Pre-Admission Testing from 6/12/2025 in [facility]   Calculated Age Score 1.34 filed at 06/12/2025 1601   Functional Status  0 filed at 06/12/2025 1601   ASA Class  -3.29 filed at 06/12/2025 1601   Creatinine 0 filed at 06/12/2025 1601   Type of Procedure  -0.26 filed at 06/12/2025 1601   FRANKLYN Total Score  -7.46 filed at 06/12/2025  1601   FRANKLYN % 0.06 filed at 06/12/2025 1601          Assessment & Plan:    Neuro:  No diagnosis or significant findings on chart review or clinical presentation and evaluation.     HEENT/Airway:  No diagnosis or significant findings on chart review or clinical presentation and evaluation.   STOP-BANG Score-2 points low risk for MARTHA    Mallampati::  II    TM distance::  >3 FB    Neck ROM::  Full  Dentures-denies  Crowns-reports x 1  Implants-denies    Cardiovascular:  HLD (asa)  METS: 9  RCRI: 0 points, 3.9%  risk for postoperative MACE   FRANKLYN: 0.06% risk for postoperative MACE    Pulmonary:  No diagnosis or significant findings on chart review or clinical presentation and evaluation.   ARISCAT: <26 points, 1.6% risk of in-hospital postoperative pulmonary complication  PRODIGY: Moderate risk for opioid induced respiratory depression  Smoking History-He has never smoked.  Deep breathing handout given    Renal/Urinary:    BPH   the patient is at increased risk of perioperative renal complications secondary to male sex. Preventative measures include BP monitoring, medication compliance, and hydration management.   CMP-Pending  Creatinine-  GFR-    Endocrine:  No diagnosis or significant findings on chart review or clinical presentation and evaluation.     Hematologic/Immunology:  thrombocytopenia  The patient is not a Jehovah’s witness and will accept blood and blood products if medically indicated.   History of previous blood transfusions No  CBC  HGB-  Caprini Score 5, patient at Low for postoperative DVT. Pt supplied education/VTE handout  Anticoagulation use: Yes     Gastrointestinal:   No diagnosis or significant findings on chart review or clinical presentation and evaluation.   Recreational drug use: none  Alcohol use 4 glasses of wine per week    Infectious disease:   No diagnosis or significant findings on chart review or clinical presentation and evaluation.     Musculoskeletal:   No diagnosis or significant  findings on chart review or clinical presentation and evaluation.   JHFRAT score- 3 points. low risk for falls    Anesthesia:  ASA 2 - Patient with mild systemic disease with no functional limitations  Anticipated anesthesia-general  History of General anesthesia- yes  Complications- No prior anesthesia  No family history of anesthesia complications    Labs & Imaging ordered:  CBC, CMP,  UA - labs in by Dr. Baltazar pt instructed to obtain labs asap   Nickel/metal allergy-negative  Shellfish allergy-negative    Discussed with patient medication instructions, NPO guidelines, and any questions or concerns.     time spent 45 minutes  All resulted testing from PAT was forwarded to the surgeon and the patient's PCP if applicable.           [1]   Past Medical History:  Diagnosis Date    Arthritis     BPH (benign prostatic hyperplasia)     Cataract     Dental disease     Hyperlipidemia     Personal history of other diseases of the respiratory system     History of acute pulmonary edema    Personal history of other endocrine, nutritional and metabolic disease     History of hyperlipidemia    Personal history of other infectious and parasitic diseases     History of onychomycosis   [2]   Past Surgical History:  Procedure Laterality Date    PROSTATE SURGERY      prostate biopsy. 2025    WISDOM TOOTH EXTRACTION  1970   [3]   Family History  Problem Relation Name Age of Onset    Arthritis Mother Rose Marie         Mother  at age 80    Diabetes Mother Rose Marie     Stroke Mother Rose Marie     Heart failure Mother Rose Marie     Osteoarthritis Mother Rose Marie     Heart disease Father Rajinder     Heart attack Father Rajinder     Prostate cancer Other family HX    [4]   Allergies  Allergen Reactions    Sulfamethoxazole-Trimethoprim Rash

## 2025-06-13 ENCOUNTER — APPOINTMENT (OUTPATIENT)
Dept: PREADMISSION TESTING | Facility: HOSPITAL | Age: 70
End: 2025-06-13
Payer: MEDICARE

## 2025-06-14 LAB
ANION GAP SERPL CALCULATED.4IONS-SCNC: 8 MMOL/L (CALC) (ref 7–17)
BACTERIA UR CULT: NORMAL
BUN SERPL-MCNC: 13 MG/DL (ref 7–25)
BUN/CREAT SERPL: NORMAL (CALC) (ref 6–22)
CALCIUM SERPL-MCNC: 9.4 MG/DL (ref 8.6–10.3)
CHLORIDE SERPL-SCNC: 104 MMOL/L (ref 98–110)
CO2 SERPL-SCNC: 28 MMOL/L (ref 20–32)
CREAT SERPL-MCNC: 0.73 MG/DL (ref 0.7–1.35)
EGFRCR SERPLBLD CKD-EPI 2021: 100 ML/MIN/1.73M2
ERYTHROCYTE [DISTWIDTH] IN BLOOD BY AUTOMATED COUNT: 12.5 % (ref 11–15)
GLUCOSE SERPL-MCNC: 76 MG/DL (ref 65–99)
HCT VFR BLD AUTO: 41.5 % (ref 38.5–50)
HGB BLD-MCNC: 13.9 G/DL (ref 13.2–17.1)
INR PPP: 1
MCH RBC QN AUTO: 32.3 PG (ref 27–33)
MCHC RBC AUTO-ENTMCNC: 33.5 G/DL (ref 32–36)
MCV RBC AUTO: 96.5 FL (ref 80–100)
PLATELET # BLD AUTO: 222 THOUSAND/UL (ref 140–400)
PMV BLD REES-ECKER: 12.8 FL (ref 7.5–12.5)
POTASSIUM SERPL-SCNC: 4.7 MMOL/L (ref 3.5–5.3)
PROTHROMBIN TIME: 10.6 SEC (ref 9–11.5)
RBC # BLD AUTO: 4.3 MILLION/UL (ref 4.2–5.8)
SODIUM SERPL-SCNC: 140 MMOL/L (ref 135–146)
WBC # BLD AUTO: 4.7 THOUSAND/UL (ref 3.8–10.8)

## 2025-06-19 DIAGNOSIS — C61 PROSTATE CANCER (MULTI): ICD-10-CM

## 2025-06-25 ENCOUNTER — APPOINTMENT (OUTPATIENT)
Dept: UROLOGY | Facility: CLINIC | Age: 70
End: 2025-06-25
Payer: MEDICARE

## 2025-06-27 ENCOUNTER — ANESTHESIA EVENT (OUTPATIENT)
Dept: OPERATING ROOM | Facility: HOSPITAL | Age: 70
End: 2025-06-27
Payer: MEDICARE

## 2025-06-27 ENCOUNTER — HOSPITAL ENCOUNTER (OUTPATIENT)
Facility: HOSPITAL | Age: 70
Setting detail: OUTPATIENT SURGERY
Discharge: HOME | End: 2025-06-27
Attending: UROLOGY | Admitting: UROLOGY
Payer: MEDICARE

## 2025-06-27 ENCOUNTER — ANESTHESIA (OUTPATIENT)
Dept: OPERATING ROOM | Facility: HOSPITAL | Age: 70
End: 2025-06-27
Payer: MEDICARE

## 2025-06-27 VITALS
TEMPERATURE: 97.5 F | BODY MASS INDEX: 22.59 KG/M2 | RESPIRATION RATE: 16 BRPM | OXYGEN SATURATION: 100 % | WEIGHT: 170.42 LBS | HEIGHT: 73 IN | SYSTOLIC BLOOD PRESSURE: 144 MMHG | DIASTOLIC BLOOD PRESSURE: 79 MMHG | HEART RATE: 59 BPM

## 2025-06-27 DIAGNOSIS — N13.8 BPH WITH OBSTRUCTION/LOWER URINARY TRACT SYMPTOMS: Primary | ICD-10-CM

## 2025-06-27 DIAGNOSIS — N40.1 BPH WITH OBSTRUCTION/LOWER URINARY TRACT SYMPTOMS: Primary | ICD-10-CM

## 2025-06-27 PROCEDURE — 3700000002 HC GENERAL ANESTHESIA TIME - EACH INCREMENTAL 1 MINUTE: Performed by: UROLOGY

## 2025-06-27 PROCEDURE — 3700000001 HC GENERAL ANESTHESIA TIME - INITIAL BASE CHARGE: Performed by: UROLOGY

## 2025-06-27 PROCEDURE — 52648 LASER SURGERY OF PROSTATE: CPT | Performed by: UROLOGY

## 2025-06-27 PROCEDURE — 7100000009 HC PHASE TWO TIME - INITIAL BASE CHARGE: Performed by: UROLOGY

## 2025-06-27 PROCEDURE — 51702 INSERT TEMP BLADDER CATH: CPT | Mod: CCI

## 2025-06-27 PROCEDURE — 2500000004 HC RX 250 GENERAL PHARMACY W/ HCPCS (ALT 636 FOR OP/ED): Performed by: UROLOGY

## 2025-06-27 PROCEDURE — 2500000004 HC RX 250 GENERAL PHARMACY W/ HCPCS (ALT 636 FOR OP/ED): Performed by: ANESTHESIOLOGIST ASSISTANT

## 2025-06-27 PROCEDURE — 3600000005 HC OR TIME - INITIAL BASE CHARGE - PROCEDURE LEVEL FIVE: Performed by: UROLOGY

## 2025-06-27 PROCEDURE — 7100000010 HC PHASE TWO TIME - EACH INCREMENTAL 1 MINUTE: Performed by: UROLOGY

## 2025-06-27 PROCEDURE — 7100000002 HC RECOVERY ROOM TIME - EACH INCREMENTAL 1 MINUTE: Performed by: UROLOGY

## 2025-06-27 PROCEDURE — 3600000010 HC OR TIME - EACH INCREMENTAL 1 MINUTE - PROCEDURE LEVEL FIVE: Performed by: UROLOGY

## 2025-06-27 PROCEDURE — 2720000007 HC OR 272 NO HCPCS: Performed by: UROLOGY

## 2025-06-27 PROCEDURE — 2500000005 HC RX 250 GENERAL PHARMACY W/O HCPCS: Performed by: UROLOGY

## 2025-06-27 PROCEDURE — C1889 IMPLANT/INSERT DEVICE, NOC: HCPCS | Performed by: UROLOGY

## 2025-06-27 PROCEDURE — 7100000001 HC RECOVERY ROOM TIME - INITIAL BASE CHARGE: Performed by: UROLOGY

## 2025-06-27 RX ORDER — MIDAZOLAM HYDROCHLORIDE 1 MG/ML
INJECTION, SOLUTION INTRAMUSCULAR; INTRAVENOUS AS NEEDED
Status: DISCONTINUED | OUTPATIENT
Start: 2025-06-27 | End: 2025-06-27

## 2025-06-27 RX ORDER — LIDOCAINE HYDROCHLORIDE 20 MG/ML
JELLY TOPICAL AS NEEDED
Status: DISCONTINUED | OUTPATIENT
Start: 2025-06-27 | End: 2025-06-27 | Stop reason: HOSPADM

## 2025-06-27 RX ORDER — ALBUTEROL SULFATE 0.83 MG/ML
2.5 SOLUTION RESPIRATORY (INHALATION) ONCE AS NEEDED
Status: DISCONTINUED | OUTPATIENT
Start: 2025-06-27 | End: 2025-06-27 | Stop reason: HOSPADM

## 2025-06-27 RX ORDER — LIDOCAINE HYDROCHLORIDE 10 MG/ML
INJECTION, SOLUTION INTRAVENOUS AS NEEDED
Status: DISCONTINUED | OUTPATIENT
Start: 2025-06-27 | End: 2025-06-27

## 2025-06-27 RX ORDER — FENTANYL CITRATE 50 UG/ML
50 INJECTION, SOLUTION INTRAMUSCULAR; INTRAVENOUS EVERY 5 MIN PRN
Status: DISCONTINUED | OUTPATIENT
Start: 2025-06-27 | End: 2025-06-27 | Stop reason: HOSPADM

## 2025-06-27 RX ORDER — SODIUM CHLORIDE, SODIUM LACTATE, POTASSIUM CHLORIDE, CALCIUM CHLORIDE 600; 310; 30; 20 MG/100ML; MG/100ML; MG/100ML; MG/100ML
100 INJECTION, SOLUTION INTRAVENOUS CONTINUOUS
Status: DISCONTINUED | OUTPATIENT
Start: 2025-06-27 | End: 2025-06-27 | Stop reason: HOSPADM

## 2025-06-27 RX ORDER — PHENAZOPYRIDINE HYDROCHLORIDE 200 MG/1
200 TABLET, FILM COATED ORAL 3 TIMES DAILY PRN
Qty: 30 TABLET | Refills: 0 | Status: SHIPPED | OUTPATIENT
Start: 2025-06-27

## 2025-06-27 RX ORDER — IPRATROPIUM BROMIDE 0.5 MG/2.5ML
500 SOLUTION RESPIRATORY (INHALATION) ONCE
Status: DISCONTINUED | OUTPATIENT
Start: 2025-06-27 | End: 2025-06-27 | Stop reason: HOSPADM

## 2025-06-27 RX ORDER — CEFAZOLIN SODIUM 2 G/100ML
2 INJECTION, SOLUTION INTRAVENOUS ONCE
Status: COMPLETED | OUTPATIENT
Start: 2025-06-27 | End: 2025-06-27

## 2025-06-27 RX ORDER — LABETALOL HYDROCHLORIDE 5 MG/ML
5 INJECTION, SOLUTION INTRAVENOUS ONCE AS NEEDED
Status: DISCONTINUED | OUTPATIENT
Start: 2025-06-27 | End: 2025-06-27 | Stop reason: HOSPADM

## 2025-06-27 RX ORDER — FENTANYL CITRATE 50 UG/ML
25 INJECTION, SOLUTION INTRAMUSCULAR; INTRAVENOUS EVERY 5 MIN PRN
Status: DISCONTINUED | OUTPATIENT
Start: 2025-06-27 | End: 2025-06-27 | Stop reason: HOSPADM

## 2025-06-27 RX ORDER — HYDRALAZINE HYDROCHLORIDE 20 MG/ML
5 INJECTION INTRAMUSCULAR; INTRAVENOUS EVERY 30 MIN PRN
Status: DISCONTINUED | OUTPATIENT
Start: 2025-06-27 | End: 2025-06-27 | Stop reason: HOSPADM

## 2025-06-27 RX ORDER — PROPOFOL 10 MG/ML
INJECTION, EMULSION INTRAVENOUS AS NEEDED
Status: DISCONTINUED | OUTPATIENT
Start: 2025-06-27 | End: 2025-06-27

## 2025-06-27 RX ORDER — ONDANSETRON HYDROCHLORIDE 2 MG/ML
INJECTION, SOLUTION INTRAVENOUS AS NEEDED
Status: DISCONTINUED | OUTPATIENT
Start: 2025-06-27 | End: 2025-06-27

## 2025-06-27 RX ORDER — SODIUM CHLORIDE, SODIUM LACTATE, POTASSIUM CHLORIDE, CALCIUM CHLORIDE 600; 310; 30; 20 MG/100ML; MG/100ML; MG/100ML; MG/100ML
INJECTION, SOLUTION INTRAVENOUS CONTINUOUS PRN
Status: DISCONTINUED | OUTPATIENT
Start: 2025-06-27 | End: 2025-06-27

## 2025-06-27 RX ORDER — FENTANYL CITRATE 50 UG/ML
INJECTION, SOLUTION INTRAMUSCULAR; INTRAVENOUS AS NEEDED
Status: DISCONTINUED | OUTPATIENT
Start: 2025-06-27 | End: 2025-06-27

## 2025-06-27 RX ORDER — CEPHALEXIN 500 MG/1
500 CAPSULE ORAL 2 TIMES DAILY
Qty: 14 CAPSULE | Refills: 0 | Status: SHIPPED | OUTPATIENT
Start: 2025-06-27 | End: 2025-07-04

## 2025-06-27 RX ADMIN — FENTANYL CITRATE 25 MCG: 50 INJECTION, SOLUTION INTRAMUSCULAR; INTRAVENOUS at 09:50

## 2025-06-27 RX ADMIN — DEXAMETHASONE SODIUM PHOSPHATE 4 MG: 4 INJECTION, SOLUTION INTRAMUSCULAR; INTRAVENOUS at 09:31

## 2025-06-27 RX ADMIN — SODIUM CHLORIDE, POTASSIUM CHLORIDE, SODIUM LACTATE AND CALCIUM CHLORIDE: 600; 310; 30; 20 INJECTION, SOLUTION INTRAVENOUS at 09:19

## 2025-06-27 RX ADMIN — PROPOFOL 180 MG: 10 INJECTION, EMULSION INTRAVENOUS at 09:25

## 2025-06-27 RX ADMIN — CEFAZOLIN SODIUM 2 G: 2 INJECTION, SOLUTION INTRAVENOUS at 09:20

## 2025-06-27 RX ADMIN — LIDOCAINE HYDROCHLORIDE 60 MG: 10 INJECTION, SOLUTION INTRAVENOUS at 09:25

## 2025-06-27 RX ADMIN — FENTANYL CITRATE 25 MCG: 50 INJECTION, SOLUTION INTRAMUSCULAR; INTRAVENOUS at 09:55

## 2025-06-27 RX ADMIN — ONDANSETRON 4 MG: 2 INJECTION, SOLUTION INTRAMUSCULAR; INTRAVENOUS at 09:53

## 2025-06-27 RX ADMIN — FENTANYL CITRATE 50 MCG: 50 INJECTION, SOLUTION INTRAMUSCULAR; INTRAVENOUS at 09:25

## 2025-06-27 RX ADMIN — MIDAZOLAM 2 MG: 1 INJECTION INTRAMUSCULAR; INTRAVENOUS at 09:21

## 2025-06-27 RX ADMIN — PROPOFOL 20 MG: 10 INJECTION, EMULSION INTRAVENOUS at 09:58

## 2025-06-27 SDOH — HEALTH STABILITY: MENTAL HEALTH: CURRENT SMOKER: 0

## 2025-06-27 ASSESSMENT — PAIN - FUNCTIONAL ASSESSMENT
PAIN_FUNCTIONAL_ASSESSMENT: 0-10

## 2025-06-27 ASSESSMENT — PAIN SCALES - GENERAL
PAINLEVEL_OUTOF10: 0 - NO PAIN
PAINLEVEL_OUTOF10: 2
PAINLEVEL_OUTOF10: 2
PAINLEVEL_OUTOF10: 0 - NO PAIN
PAIN_LEVEL: 0
PAINLEVEL_OUTOF10: 0 - NO PAIN

## 2025-06-27 ASSESSMENT — PAIN DESCRIPTION - DESCRIPTORS
DESCRIPTORS: DULL;BURNING
DESCRIPTORS: DISCOMFORT

## 2025-06-27 NOTE — ANESTHESIA PROCEDURE NOTES
Airway  Date/Time: 6/27/2025 9:26 AM  Reason: elective    Airway not difficult    Staffing  Performed: BANDAR   Authorized by: Marita Gamino MD MPH    Performed by: BANDAR Carrasco  Patient location during procedure: OR    Patient Condition  Indications for airway management: anesthesia  Patient position: sniffing  Sedation level: deep     Final Airway Details   Preoxygenated: yes  Final airway type: supraglottic airway  Successful airway: classic  Size: 5  Number of attempts at approach: 1    Additional Comments  EASY, ATRAUMATIC LMA PLACEMENT TO GOOD SEAL  SOFT BITE BLOCK PLACED

## 2025-06-27 NOTE — ANESTHESIA PREPROCEDURE EVALUATION
Patient: Tre Isidro    Procedure Information       Date/Time: 06/27/25 0840    Procedure: Greenlight Photoselective Vaporization of Prostate (greenlight)    Location: [facility] 02 / Virtual [facility]    Surgeons: Nell Baltazar MD            Relevant Problems   Anesthesia (within normal limits)      Cardiac   (+) Hyperlipidemia      Pulmonary   (+) Exertional shortness of breath      Neuro (within normal limits)      GI (within normal limits)      /Renal   (+) BPH with obstruction/lower urinary tract symptoms   (+) Prostate cancer (Multi)      Liver (within normal limits)      Endocrine (within normal limits)      Hematology   (+) Thrombocytopenia (Now nl plt count)      Musculoskeletal (within normal limits)      HEENT (within normal limits)      ID (within normal limits)      Skin (within normal limits)      GYN (within normal limits)     Past Surgical History:   Procedure Laterality Date    PROSTATE SURGERY      prostate biopsy. Feb 2025    WISDOM TOOTH EXTRACTION  1970       Clinical information reviewed:                   NPO Detail:  No data recorded     Physical Exam    Airway  Mallampati: I  TM distance: >3 FB  Neck ROM: full  Mouth opening: 3 or more finger widths     Cardiovascular    Dental    Pulmonary    Abdominal            Lab Results   Component Value Date    WBC 4.7 06/12/2025    HGB 13.9 06/12/2025    HCT 41.5 06/12/2025    MCV 96.5 06/12/2025     06/12/2025       Chemistry    Lab Results   Component Value Date/Time     06/12/2025 1459    K 4.7 06/12/2025 1459     06/12/2025 1459    CO2 28 06/12/2025 1459    BUN 13 06/12/2025 1459    CREATININE 0.73 06/12/2025 1459    Lab Results   Component Value Date/Time    CALCIUM 9.4 06/12/2025 1459    ALKPHOS 60 02/20/2025 1127    AST 24 02/20/2025 1127    ALT 18 02/20/2025 1127    BILITOT 0.7 02/20/2025 1127          Anesthesia Plan    History of general anesthesia?: no  History of complications of general anesthesia?: no    ASA 2     general      The patient is not a current smoker.  Patient was not previously instructed to abstain from smoking on day of procedure.  Patient did not smoke on day of procedure.    intravenous induction   Postoperative pain plan includes opioids.  Trial extubation is planned.  Anesthetic plan and risks discussed with patient.  Use of blood products discussed with patient who consented to blood products.    Plan discussed with CAA.

## 2025-06-27 NOTE — NURSING NOTE
Patient in Phase 2; dressed and up to chair with RN assist. Tolerating po fluids, minimal complaint of pain and no complaint of nausea.     Spouse at bedside; discussed discharge instructions with patient and spouse. All questions at this time answered.     Discharge instructions provided using teachback method.  Patient's health-related risk factors discussed with patient.  Patient educated to look for worsening signs and symptoms and educated to seek medical attention if experiencing medical emergency.  Patient aware of needs to follow up with outpatient clinics as scheduled. Home going meds reviewed with patient.  Patient verbalized understanding of disposition and discharge instructions.  All questions answered to patient's satisfaction and within nursing scope of practice.    Patient clinically appropriate for discharge. Vitals stable/baseline.IV removed and patient transported to discharge area via wheelchair.

## 2025-06-27 NOTE — DISCHARGE INSTRUCTIONS
Diet  You can eat whatever you like after your surgery. Sometimes the anesthesia can cause nausea, so it may be a good idea to stay away from heavy foods right after you get home from the procedure.    Mathur catheter  You will have a tube in the bladder called a mathur catheter. This drains urine from the bladder and exits the penis. Be sure the catheter is well secured to the leg at all times. This catheter typically stays in from one day to a week (7 days) depending on your circumstances. There should never be any tension or tugging on the catheter. Take care not to pull on the catheter when rolling in bed or changing position. The nurses will show you how to attach the mathur catheter to a leg bag during the day and a big bag at night.    The mathur catheter has a balloon on the end of it to keep it in place in the bladder. This may give you the feeling you need to urinate. Be assured the catheter is draining and the sensation is from the mathur catheter balloon. You may also notice urine or blood-tinged urine leaking around the catheter out the tip of the penis. Typically, this due to a bladder spasm and is not a cause for alarm. If the catheter stops draining, get up and walk around. If it is still not draining, call the office or come to the emergency room as it may be clogged and need to be irrigated. Once the mathur catheter is removed, it is normal to have burning and stinging with urination for a few weeks after surgery. It is also common to have more frequent urination and a greater sense of the urge to urinate. There may not be much warning from the time you feel the urge to urinate to the time when the bladder is ready to empty.    Activity  It is very important to walk after your procedure. Walking prevents blood clots in the legs or lungs. You may go up and down stairs. Avoid any strenuous activity or lifting more than ten pounds for 3 to 4 weeks. This includes any heavy lifting, running, riding a bicycle  or golf. This also includes activities such as raking leaves, mowing the lawn, shoveling snow or other strenuous chores. If you see blood in the urine, increase the amount of water you are drinking and avoid strenuous activity or heavy lifting until the blood clears.    Medications  Take the medications prescribed at the time of your discharge from the hospital. If you are taking any medications on a regular basis prior to your admission to the hospital, you should continue to take those as well. For any aches, pains or headaches, you may use Tylenol or Extra-Strength Tylenol. Sometimes, you will be given a prescription for other pain killers. Do not use any aspirin or aspirin-like compounds or ibuprofen products (NSAIDs) (eg. Advil, Nuprin, Motrin, Bufferin, etc.) for four weeks after surgery. If you start aspirin or aspirin like compounds and you notice blood in your urine, please stop taking it and increase your water intake. Pyridium will be called in that will help with burning.  It will color your urine orange.  Antibiotic will be also prescribed for 1 week.    Avoid constipation  Anesthesia, surgery and narcotic pain medication all increase your risk for constipation. Do not strain to move the bowels as this can impair the healing process and start bleeding. Take plenty of fiber, water and over the counter stool softener to avoid constipation. Stool softener can be taken by mouth twice a day to avoid constipation. A stool softener or laxative is available at any drug store without a prescription (senna or Senokot or SennaGen, Dulcolax or bisacodyl, Miralax, Metamucil, Milk of Magnesia or magnesium hydroxide). Decrease or hold the stool softener for diarrhea or loose stools.    Follow up plan  If you develop a fever greater than 101 degrees Fahrenheit, the catheter stops draining or you are unable to urinate, call the office or come to the emergency room.  Your catheter removal has been scheduled  Please call  [phone] and follow prompts for Dr. Baltazar's  if you are not sure of your appointment time or location

## 2025-06-27 NOTE — ANESTHESIA POSTPROCEDURE EVALUATION
Patient: Tre Isidro    Procedure Summary       Date: 06/27/25 Room / Location: [facility] / [facility]    Anesthesia Start: 0919 Anesthesia Stop: 1008    Procedure: Greenlight Photoselective Vaporization of Prostate (greenlight) Diagnosis:       BPH with obstruction/lower urinary tract symptoms      (BPH with obstruction/lower urinary tract symptoms [N40.1, N13.8])    Surgeons: Nell Baltazar MD Responsible Provider: Marita Gamino MD MPH    Anesthesia Type: general ASA Status: 2            Anesthesia Type: general    Vitals Value Taken Time   /79 06/27/25 10:31   Temp 37 °C (98.6 °F) 06/27/25 10:35   Pulse 56 06/27/25 10:35   Resp 12 06/27/25 10:35   SpO2 97 % 06/27/25 10:36   Vitals shown include unfiled device data.    Anesthesia Post Evaluation    Patient location during evaluation: PACU  Patient participation: complete - patient participated  Level of consciousness: awake and alert  Pain score: 0  Pain management: adequate  Multimodal analgesia pain management approach  Airway patency: patent  Two or more strategies used to mitigate risk of obstructive sleep apnea  Cardiovascular status: acceptable  Respiratory status: acceptable  Hydration status: acceptable  Postoperative Nausea and Vomiting: none        There were no known notable events for this encounter.

## 2025-06-27 NOTE — PERIOPERATIVE NURSING NOTE
1004 Arrives to pacu 2 Ramez pain and nausea. Scant amt of bleeding at meatus. Ramos draining clear yellow urine. Moving all extremities drinking po fluids following commands    1014 Continues to drinking fluids vital stable sinus chanel. Urine draining clear pinkish tinged urine..    1034 NO pain or nausea. Slit burning catheter area. Draining light clear pink.    1038 To SDS.

## 2025-06-27 NOTE — OP NOTE
Greenlight Photoselective Vaporization of Prostate (greenlight) Operative Note     Date: 2025  OR Location: [facility]    Name: Tre Isidro, : 1955, Age: 69 y.o., MRN: [id number], Sex: male    Diagnosis  Pre-op Diagnosis      * BPH with obstruction/lower urinary tract symptoms [N40.1, N13.8] Post-op Diagnosis     * BPH with obstruction/lower urinary tract symptoms [N40.1, N13.8]     Procedures  Greenlight Photoselective Vaporization of Prostate (greenlight)  16972 - CT LASER VAPORIZATION OF PROSTATE FOR URINE FLOW      Surgeons      * Nell Baltazar - Primary    Resident/Fellow/Other Assistant:  Surgeons and Role:     * Tre Ramirez MD - Resident - Assisting    Staff:   Circulator: Shawna Romeo Circulator: Rebeca Desai Person: Thi    Anesthesia Staff: Anesthesiologist: Marita Gamino MD MPH  C-AA: BANDAR Carrasco    Procedure Summary  Anesthesia: General  ASA: II  Estimated Blood Loss: 5mL  Intra-op Medications:   Administrations occurring from 0840 to 0955 on 25:   Medication Name Total Dose   lidocaine 2 % mucosal jelly (Uro-Jet) 1 Application   dexAMETHasone (Decadron) 4 mg/mL IV Syringe 2 mL 4 mg   fentaNYL (Sublimaze) injection 50 mcg/mL 100 mcg   LR infusion Cannot be calculated   lidocaine PF (cardiac) syringe 1% 60 mg   midazolam (Versed) injection 1 mg/mL 2 mg   ondansetron (Zofran) 2 mg/mL injection 4 mg   propofol (Diprivan) injection 10 mg/mL 180 mg   ceFAZolin (Ancef) 2 g in dextrose (iso)  mL 2 g              Anesthesia Record               Intraprocedure I/O Totals          Intake    LR infusion 800.00 mL    ceFAZolin (Ancef) 2 g in dextrose (iso)  mL 100.00 mL    Total Intake 900 mL          Specimen: No specimens collected              Drains and/or Catheters:   Urethral Catheter Other (Comment) 20 Fr. (Active)       Tourniquet Times:         Implants:     Findings: BPH, elevated bladder neck    Indications: Tre Isidro is an 69 y.o. male who is having  surgery for BPH with obstruction/lower urinary tract symptoms [N40.1, N13.8].     The patient was seen in the preoperative area. The risks, benefits, complications, treatment options, non-operative alternatives, expected recovery and outcomes were discussed with the patient. The possibilities of reaction to medication, pulmonary aspiration, injury to surrounding structures, bleeding, recurrent infection, the need for additional procedures, failure to diagnose a condition, and creating a complication requiring transfusion or operation were discussed with the patient. The patient concurred with the proposed plan, giving informed consent.  The site of surgery was properly noted/marked if necessary per policy. The patient has been actively warmed in preoperative area. Preoperative antibiotics have been ordered and given within 1 hours of incision. Venous thrombosis prophylaxis have been ordered including bilateral sequential compression devices    Procedure Details: After obtaining informed consent, the patient was brought to the operating room, placed on the operating table in a supine position. Preoperative time-out was performed.  Preoperative urine culture was reviewed.  The patient received antibiotic intraoperatively for operative prophylaxis. Pt underwent general anesthesia without complication.  The patient was repositioned into the dorsal lithotomy position and prepped and draped in the usual sterile fashion.  A 22-Omani continuous-flow laser cystoscope was inserted into the  bladder via the urethra atraumatically using a visual obturator, pan cystoscopy was performed.  There were no abnormal tumors or lesions noted in the bladder.  Bilateral ureteral orifices were identified in the normal orthotopic position.  The scope was then replaced into the prostatic fossa.  There was bilobar prostatic enlargement.  There was no intravesical median lobe.  At this time, we took out the visual obturator and inserted a  continuous-flow laser working element.  A 180 watt GreenLight laser was then inserted.  We then proceeded to vaporize the prostatic tissue.  Our vaporization was started on the right lateral lobe at the level of the bladder neck and vaporization was carried down to the level of the verumontanum distally.  Care was taken at our proximal landmark to visualize the ureteral orifices at all times so that they would be excluded from our vaporization.  The right lateral lobe was vaporized down to the level of the prostatic capsule.  Attention was then turned to the left side and the procedure was repeated here.  Again, we took care to perform vaporization from the level of the bladder neck down to the level of the verumontanum, avoiding the external urinary sphincter.   At the conclusion of the procedure, there was a large TUR-like defect urinary channel that was wide open.  The ureteral orifices could be identified orthotopically, again outside of our vaporization and unharmed.  The external sphincter was also noted to be intact.  At the conclusion of the procedure, again the patient's bladder was emptied.  The cystoscope was removed from the bladder, and a single-lumen catheter was then placed and 20 cc of sterile water were placed in the balloon with return of clear pink-tinged urine.  The patient was then awoken from general anesthesia, taken out of the lithotomy, and transferred to the PACU in stable condition.    Evidence of Infection: No   Complications:  None; patient tolerated the procedure well.    Disposition: PACU - hemodynamically stable.  Condition: stable                 Additional Details:     Attending Attestation: I was present and scrubbed for the entire procedure.    Nell Baltazar  Phone Number: [phone]

## 2025-06-29 NOTE — PROGRESS NOTES
[facility]  Outpatient Clinic Note    Patient Name:  Tre Isidro  MRN:  [id number]  :  1955  Date of Service: 2025     Visit type: Follow up visit    HPI    Interval History:  Tre Isidro is a 69 y.o. male who is being seen today for  problems listed below.     Problem list/Chief complaints:  BPH with LUTs, incomplete bladder emptying - s/p Greenlight PVP on 25 with Dr. Baltazar  Elevated PSA - PI-RADS 4 lesion s/p TP prostate biopsy (25) with pathology showing prostatic adenocarcinoma  Prostate cancer : GG1 (Tilly score 3+3=6), 1/2 cores (90% of tissue) currently under active surveillance. Patient presents today for cystoscopy. Prostate was 9.9cc on MRI from 2025.   ED      25: Visit with Dr. Baltazar. 67 year old male presents today as a new patient for , kindly referred by Dr. Leal. Early July he developed UTI symptoms including burning with urination, dysuria, and an increase in urinary urgency and frequency. His urine culture was negative. He was treated for suspected prostatitis with Amoxicillin and Ciprofloxacin. He notices that his symptoms are worse when he is at work vs working remote. He voids roughly every 2-3 hours during the day. Reports nocturia x1 but does not feel sleep deprived. He does have urinary urgency and had a few episodes of urge incontinence. He will have occasional weak stream at night. He feels empty after voiding. Patient denies gross hematuria, fever, and chills. Patient denies a history of kidney stones. PSA total was 1.3 on 23. He denies any family history of prostate cancer. Patient is a non smoker. His initial voiding residual was 500 and second .   We will proceed with GreenLight laser PVP. I discussed in detail the risks associated with the GreenLight laser PVP procedure, which include small risk of urinary incontinence, risk of erectile dysfunction, 60% risk of retrograde ejaculation, and additional risk of  hematuria, UTI, and failure to improve urinary symptoms.     6/27/25: S/p Greenlight PVP with Dr. Baltazar    6/30/25: Patient presents for follow up and TOV. He has been doing well since surgery. Urine is clear and yellow draining into catheter bag. Pain has been managed with oral pain medications. He is eating and drinking with no issues, he has normal bowel movements. He denies any fevers or chills. He is ambulating at baseline. TOV passed, PVR 42 ml.      Medical History[1]    Surgical History[2]    Social History     Socioeconomic History    Marital status:      Spouse name: Not on file    Number of children: Not on file    Years of education: Not on file    Highest education level: Not on file   Occupational History    Not on file   Tobacco Use    Smoking status: Never     Passive exposure: Never    Smokeless tobacco: Never    Tobacco comments:     Pt denies any illness in past 30 days     No past surgery for pt with GA, denies family reaction or problems with anesthesia     Denies SOB with stairs/daily activity     Ambulates freely   Vaping Use    Vaping status: Never Used   Substance and Sexual Activity    Alcohol use: Yes     Alcohol/week: 4.0 standard drinks of alcohol     Types: 4 Glasses of wine per week     Comment: every few days    Drug use: Not Currently    Sexual activity: Yes     Partners: Female   Other Topics Concern    Not on file   Social History Narrative    Not on file     Social Drivers of Health     Financial Resource Strain: Not on file   Food Insecurity: Not on file   Transportation Needs: Not on file   Physical Activity: Not on file   Stress: Not on file   Social Connections: Not on file   Intimate Partner Violence: Not on file   Housing Stability: Not on file       Allergies[3]     Current Medications[4]     Review of system:  All other systems have been reviewed and are negative for complaints      Last recorded vitals:  There were no vitals taken for this visit.    Physical  Exam:  General: Appears comfortable and in no apparent distress.  Head: Normocephalic, atraumatic  Eyes: Non-injected conjunctiva, sclera clear, no proptosis  Lungs: Breathing is easy, non-labored. Speaking in clear and complete sentences. Normal diaphragmatic movement.  Cardiovascular: no peripheral edema, cyanosis or pallor.   Abdomen: soft, non-distended, non-tender  : Bladder: non tender, not distended  MSK: Ambulatory with steady gait, unassisted  Skin: No visible rashes or lesions  Neurologic: Alert, oriented to person, place, and time  Psychiatric: mood and affect appropriate      Imaging  MR prostate with james boundaries if pirads 3 or above  Narrative: Interpreted By:  Dotty Beckwith,  and Baldemar Morgan   STUDY:  MR PROSTATE WITH JAMES BOUNDARIES IF PIRADS 3 OR ABOVE;  1/13/2025 7:27  pm      INDICATION:  Signs/Symptoms:bph with lower urinary tract symptoms, prostate volume  for surgical planning, fusion if abnormal.      ,N40.1 Benign prostatic hyperplasia with lower urinary tract  symptoms,N13.8 Other obstructive and reflux uropathy      PSA 08/02/2023: 1.3      COMPARISON:  None.      ACCESSION NUMBER(S):  [id number]      ORDERING CLINICIAN:  CLINTON JACOBS      TECHNIQUE:  Multiplanar MRI of the pelvis was obtained including axial, sagittal  and coronal T2 weighted SSFSE, axial and sagittal T2 FSE, axial DWI,  pre and post gadolinium dynamic T1 GRE sequences. Multiparametric  analysis was performed.      15 ML of Dotarem was administered intravenously without immediate  complications.   3D post-processing was performed using [employer], on  an independent workstation, for the purpose of enabling fusion with  ultrasound, and provided it for review.      FINDINGS:  PROSTATE VOLUME:  The prostate measures 3.2 x 1.9 x 3.1 cm in right-to-left,  anterior-posterior and craniocaudal dimension.      Prostate weight is estimated at 9.9 cc. PSA density is 0.132 ng/mL/g.      PROSTATE PARENCHYMA:  Distortion of the  diffusion-weighted images secondary to large amount  of stool within the rectum.      The transitional zone is heterogeneous in decreased in size, which  may represent postsurgical change. Findings consistent with benign  prostatic hyperplasia within the transitional zone. There is an  irregular hypointensity on T2 weighted images within the right mid  gland peripheral zone measuring 0.7 cm (series 6, image 16) with  minimal associated restricted diffusion. There is mild associated  early enhancement. An additional irregular T2 hypointensity is noted  within the left peripheral zone within the base of the prostate with  associated mild restricted diffusion and mild early enhancement  measuring 1.0 cm (series 6, image 13).      EXTRACAPSULAR EXTENSION:  None.      SEMINAL VESICLES:  Within normal limits.      PELVIC LYMPH NODES:  No abnormally enlarged pelvic lymph nodes are identified.      PERITONEUM:  No free or loculated fluid collections are evident in the pelvis.      OTHER ORGANS:  The urinary bladder is not well distended, limiting evaluation.  However there is diffuse wall thickening, with fecal involvement of  the right wall with trabeculation, likely due to component of chronic  outlet obstruction. No focal diffusion restriction or abnormal  enhancement within the limits of the current exam.      Large stool burden is noted within the rectal vault with distention  measuring 8.1 x 6.9 cm, which causes distortion of the  diffusion-weighted images.      BONES:  No focal lesions are noted in the bone.      Exam Quality:  Is T2WI weighted imaging of diagnostic quality:  Yes. T2WI  assessment:  Adequate. Is DWI of diagnostic quality:  Yes. DWI  assessment:  Adequate. Is DCE of diagnostic quality:  N/A. DCE  assessment:  N/A. PI-QUAL score:  All sequences are of optimal  diagnostic quality Comments:      Impression: Exam is hindered by large stool burden in the rectum with distortion  of the diffusion-weighted  images. Within this limitation:  1. PI-RADS 4 lesion within the right peripheral zone of the midgland.  2. PI-RADS 4 lesion within the left peripheral zone of the base of  the prostate.  3. No evidence of pelvic lymphadenopathy.  4. Diffuse wall thickening of the bladder with focal thickening of  the right lateral wall which can be secondary to nondistention.      PI-RADS 4 - High (clinically significant cancer is likely to be  present).      I personally reviewed the images/study and I agree with the findings  as stated. This study was interpreted at [place], [place].      MACRO:  None      Signed by: Dotty Beckwith 1/20/2025 11:17 PM  Dictation workstation:   [id number]    Cystoscopy findings: 4/29/25  Urethra: normal course and caliber, no evidence of stricture or lesion.  Prostate: non-obstructing median lobe.   Bladder: trabeculated, distended, no stone, tumors or other lesions.  Post-cystoscopy: Patient tolerated procedure without complications.     [x] Lateral hypertrophy, with partial channel occlusion.  [] Obstructing median lobe with intravesical protrusion.  [x] Good sphincter coaptation.  [] Normal bladder.    Labs  Orders Only on 06/09/2025   Component Date Value    GLUCOSE 06/12/2025 76     UREA NITROGEN (BUN) 06/12/2025 13     CREATININE 06/12/2025 0.73     EGFR 06/12/2025 100     BUN/CREATININE RATIO 06/12/2025 SEE NOTE:     SODIUM 06/12/2025 140     POTASSIUM 06/12/2025 4.7     CHLORIDE 06/12/2025 104     CARBON DIOXIDE 06/12/2025 28     ELECTROLYTE BALANCE 06/12/2025 8     CALCIUM 06/12/2025 9.4     WHITE BLOOD CELL COUNT 06/12/2025 4.7     RED BLOOD CELL COUNT 06/12/2025 4.30     HEMOGLOBIN 06/12/2025 13.9     HEMATOCRIT 06/12/2025 41.5     MCV 06/12/2025 96.5     MCH 06/12/2025 32.3     MCHC 06/12/2025 33.5     RDW 06/12/2025 12.5     PLATELET COUNT 06/12/2025 222     MPV 06/12/2025 12.8 (H)     INR 06/12/2025 1.0     PT 06/12/2025 10.6     CULTURE,  URINE, ROUTINE 06/12/2025 SEE NOTE        Assessment and Plan:  Tre Isidro is a 69 y.o. male with history of ED, elevated PSA, prostate cancer, BPH with LUTs, incomplete bladder emptying - s/p Greenlight PVP on 6/27/25 with Dr. Baltazar, who presents for POV and TOV.     We discussed postoperative urinary retention in great detail. We discussed that different medications, including anesthesia can influence urinary retention. We discussed that postoperative constipation can also contribute to urinary retention. We discussed management of urinary retention to include indwelling catheter or CIC. We discussed risks of unmanaged urinary retention including irreversible kidney injury and increased risk of UTI. We discussed TOV today.    Plan:  - TOV today passed  - PVR 42 ml  - Patient encouraged to drink plenty of fluids to maintain hydration and clear urine output  - Discussed that they may have some irritative voiding symptoms over the next few days: burning, frequency, urgency  - Activity restrictions reviewed, discussed post op expectations  - Patient instructed to go to ED if unable to void in 4-6 hr or unable to void with urge  -Follow-up on 9/29/25 with Dr. Baltazar as scheduled, or sooner if needed, to reassess symptoms and for medication refill.    All questions and concerns were addressed. Patient verbalizes understanding and has no other questions at this time.     Some elements copied from Dr. Baltazar's note on 4/29/25, the elements have been updated and all reflect current decision making from today, 06/30/25    E&M visit today is associated with current or anticipated ongoing medical care services related to a patient's single, serious condition or a complex condition.    GUNNAR Fraga-CNP   [facility]  06/30/25 2:15 PM         [1]   Past Medical History:  Diagnosis Date    Arthritis 1/1/2020    Fingers    BPH (benign prostatic hyperplasia)     Cataract 4/1/2022    Does not require surgery     Dental disease 1/1/2010    Hyperlipidemia     Personal history of other diseases of the respiratory system     History of acute pulmonary edema    Personal history of other endocrine, nutritional and metabolic disease     History of hyperlipidemia    Personal history of other infectious and parasitic diseases     History of onychomycosis   [2]   Past Surgical History:  Procedure Laterality Date    PROSTATE SURGERY      prostate biopsy. Feb 2025    WISDOM TOOTH EXTRACTION  1970   [3]   Allergies  Allergen Reactions    Sulfamethoxazole-Trimethoprim Rash   [4]   Current Outpatient Medications:     ascorbic acid (Vitamin C) 1,000 mg tablet, Take 1 tablet (1,000 mg) by mouth once daily., Disp: , Rfl:     aspirin 81 mg EC tablet, Take 1 tablet (81 mg) by mouth once daily., Disp: , Rfl:     b complex vitamins capsule, Take 1 capsule by mouth once daily., Disp: , Rfl:     cephalexin (Keflex) 500 mg capsule, Take 1 capsule (500 mg) by mouth 2 times a day for 7 days., Disp: 14 capsule, Rfl: 0    cholecalciferol (Vitamin D-3) 5,000 Units tablet, Take 1 tablet (125 mcg) by mouth once daily., Disp: , Rfl:     DIGESTIVE ENZYMES, PLANT, ORAL, Take by mouth., Disp: , Rfl:     NON FORMULARY, 2 times a day. cholesterax, Disp: , Rfl:     NON FORMULARY, once daily as needed (pain). Zyslamend for joint pain, Disp: , Rfl:     phenazopyridine (Pyridium) 200 mg tablet, Take 1 tablet (200 mg) by mouth 3 times a day as needed for bladder spasms for up to 30 doses., Disp: 30 tablet, Rfl: 0

## 2025-06-30 ENCOUNTER — OFFICE VISIT (OUTPATIENT)
Dept: UROLOGY | Facility: HOSPITAL | Age: 70
End: 2025-06-30
Payer: MEDICARE

## 2025-06-30 DIAGNOSIS — Z48.89 POSTOPERATIVE VISIT: ICD-10-CM

## 2025-06-30 DIAGNOSIS — N40.1 BPH WITH OBSTRUCTION/LOWER URINARY TRACT SYMPTOMS: Primary | ICD-10-CM

## 2025-06-30 DIAGNOSIS — N13.8 BPH WITH OBSTRUCTION/LOWER URINARY TRACT SYMPTOMS: Primary | ICD-10-CM

## 2025-06-30 PROCEDURE — 99213 OFFICE O/P EST LOW 20 MIN: CPT | Performed by: NURSE PRACTITIONER

## 2025-06-30 PROCEDURE — 99024 POSTOP FOLLOW-UP VISIT: CPT | Performed by: NURSE PRACTITIONER

## 2025-06-30 PROCEDURE — 1036F TOBACCO NON-USER: CPT | Performed by: NURSE PRACTITIONER

## 2025-06-30 PROCEDURE — 1160F RVW MEDS BY RX/DR IN RCRD: CPT | Performed by: NURSE PRACTITIONER

## 2025-06-30 PROCEDURE — 1159F MED LIST DOCD IN RCRD: CPT | Performed by: NURSE PRACTITIONER

## 2025-06-30 PROCEDURE — 51798 US URINE CAPACITY MEASURE: CPT | Performed by: NURSE PRACTITIONER

## 2025-06-30 NOTE — PROGRESS NOTES
Pt is here today for a trial of void, name and date of birth was verified. Procedure was explained to pt, and understood. Pt tolerated 240cc of sterile saline infused through urinary catheter without difficulty. Urinary catheter was removed and patient [id number] cc. A PVR scan was done and showed 42mL of urine left in the bladder.   It was explained to pt that if unable to urinate to go to the emergency room. Pt was told and understood to drink plenty of fluids to keep the bladder stimulated. If there were any questions or concerns pt understood --- he/she is to call the office.

## 2025-08-04 DIAGNOSIS — N40.1 BPH WITH OBSTRUCTION/LOWER URINARY TRACT SYMPTOMS: ICD-10-CM

## 2025-08-04 DIAGNOSIS — N13.8 BPH WITH OBSTRUCTION/LOWER URINARY TRACT SYMPTOMS: ICD-10-CM

## 2025-08-07 LAB — BACTERIA UR CULT: NORMAL

## 2025-08-22 DIAGNOSIS — E78.2 MIXED HYPERLIPIDEMIA: Primary | ICD-10-CM

## 2025-09-03 ENCOUNTER — RESULTS FOLLOW-UP (OUTPATIENT)
Dept: PRIMARY CARE | Facility: CLINIC | Age: 70
End: 2025-09-03
Payer: MEDICARE

## 2025-09-03 LAB
CHOLEST SERPL-MCNC: 175 MG/DL
CHOLEST/HDLC SERPL: 3.9 (CALC)
HDLC SERPL-MCNC: 45 MG/DL
LDLC SERPL CALC-MCNC: 109 MG/DL (CALC)
NONHDLC SERPL-MCNC: 130 MG/DL (CALC)
PSA SERPL-MCNC: 1.11 NG/ML
TRIGL SERPL-MCNC: 106 MG/DL

## 2025-09-05 ENCOUNTER — APPOINTMENT (OUTPATIENT)
Dept: PRIMARY CARE | Facility: CLINIC | Age: 70
End: 2025-09-05
Payer: MEDICARE

## 2025-09-05 PROBLEM — H91.92 DECREASED HEARING, LEFT: Status: ACTIVE | Noted: 2025-09-05

## 2025-09-05 PROBLEM — H61.23 BILATERAL IMPACTED CERUMEN: Status: ACTIVE | Noted: 2025-09-05

## 2025-09-05 PROBLEM — Z00.00 MEDICARE ANNUAL WELLNESS VISIT, SUBSEQUENT: Status: ACTIVE | Noted: 2025-09-05

## 2025-09-05 ASSESSMENT — ACTIVITIES OF DAILY LIVING (ADL)
DOING_HOUSEWORK: INDEPENDENT
DRESSING: INDEPENDENT
BATHING: INDEPENDENT
TAKING_MEDICATION: INDEPENDENT
MANAGING_FINANCES: INDEPENDENT
GROCERY_SHOPPING: INDEPENDENT

## 2025-09-05 ASSESSMENT — ENCOUNTER SYMPTOMS
LOSS OF SENSATION IN FEET: 0
OCCASIONAL FEELINGS OF UNSTEADINESS: 0
DEPRESSION: 0

## 2025-09-11 ENCOUNTER — APPOINTMENT (OUTPATIENT)
Dept: UROLOGY | Facility: CLINIC | Age: 70
End: 2025-09-11
Payer: MEDICARE

## 2025-09-25 ENCOUNTER — APPOINTMENT (OUTPATIENT)
Dept: UROLOGY | Facility: CLINIC | Age: 70
End: 2025-09-25
Payer: MEDICARE

## 2025-09-29 ENCOUNTER — APPOINTMENT (OUTPATIENT)
Dept: UROLOGY | Facility: CLINIC | Age: 70
End: 2025-09-29
Payer: MEDICARE

## 2025-10-08 ENCOUNTER — APPOINTMENT (OUTPATIENT)
Dept: INTEGRATIVE MEDICINE | Facility: CLINIC | Age: 70
End: 2025-10-08
Payer: MEDICARE

## (undated) DEVICE — Device

## (undated) DEVICE — DRESSING, TELFA, 3X4

## (undated) DEVICE — BAG, DRAINAGE, ANTI-REFLUX CHAMBER, 2000ML

## (undated) DEVICE — WATER, STERILE, FOR IRRIGATION USP, 500MI AQUALITE

## (undated) DEVICE — FIBER, MOXY, LIQUID COOLED

## (undated) DEVICE — KIT, NERVE ROOT BLOCK, SPINAL, 22 G X 6 IN

## (undated) DEVICE — BLANKET, LOWER BODY, VHA PLUS, ADULT

## (undated) DEVICE — APPLICATOR, CHLORAPREP, W/ORANGE TINT, 26ML

## (undated) DEVICE — GOWN, SURGICAL, SIRUS, NON REINFORCED, LARGE

## (undated) DEVICE — SPONGE, GAUZE, AVANT, STERILE, NONWOVEN, 4PLY, 4 X 4, STANDARD

## (undated) DEVICE — CATHETER, URETHRAL, FOLEY, 2 WAY, BARDEX LUBRICATH, SHORT LENGTH, 22 FR, 5 CC, LATEX

## (undated) DEVICE — IRRIGATION SET, CYSTOSCOPY, F/CONSTANT/INTERMITTENT, 8 GTT/CC, 77 IN

## (undated) DEVICE — BAG, DRAINAGE, CONTINUOUS IRRIGATION, 4L

## (undated) DEVICE — SOLUTION, IRRIGATION, X RX SODIUM CHL 0.9%, 1000ML BTL

## (undated) DEVICE — GLOVE, SURGICAL, PROTEXIS PI , 7.5, PF, LF

## (undated) DEVICE — SYRINGE, TOOMEY, IRRIGATION, 60ML, INDIVIDUAL WRAP, STERILE

## (undated) DEVICE — SYRINGE, 50 CC, IRRIGATION, CATHETER TIP, DISPOSABLE, STERILE, LF

## (undated) DEVICE — GLOVE, SURGICAL, PROTEXIS PI , 6.5, PF, LF

## (undated) DEVICE — NEEDLE, BIOPSY, MAX CORE, 18G